# Patient Record
Sex: FEMALE | Race: WHITE | NOT HISPANIC OR LATINO | Employment: FULL TIME | URBAN - METROPOLITAN AREA
[De-identification: names, ages, dates, MRNs, and addresses within clinical notes are randomized per-mention and may not be internally consistent; named-entity substitution may affect disease eponyms.]

---

## 2017-01-17 ENCOUNTER — OFFICE VISIT (OUTPATIENT)
Dept: PSYCHIATRY | Facility: CLINIC | Age: 32
End: 2017-01-17
Payer: COMMERCIAL

## 2017-01-17 DIAGNOSIS — F41.9 ANXIETY: ICD-10-CM

## 2017-01-17 DIAGNOSIS — F33.0 MDD (MAJOR DEPRESSIVE DISORDER), RECURRENT EPISODE, MILD: Primary | ICD-10-CM

## 2017-01-17 PROCEDURE — 90834 PSYTX W PT 45 MINUTES: CPT | Mod: S$GLB,,, | Performed by: SOCIAL WORKER

## 2017-01-18 NOTE — PROGRESS NOTES
Individual Psychotherapy (PhD/LCSW)    1/17/2017    Site:  [facility]         Therapeutic Intervention: Met with patient.  Outpatient - Insight oriented psychotherapy 45 min - CPT code 12285 and Outpatient - Supportive psychotherapy 45 min - CPT Code 55330    Chief complaint/reason for encounter: depression and anxiety     Interval history and content of current session: Patient returned to the clinic today for follow up appointment.  Checks in as feeling good today.  She reports her mood has improved.  Prozac was recently increased.  Coping better with stress at work.  She continues to work on setting appropriate boundaries with work (eg not checking work e-mail when at home).  She and her spouse are trying to eat better.  They have started a new diet, and are holding each other accountable.  Discussed life balance and self-care with patient.  She is looking forward to taking a trip with her spouse during [place].  They are travelling to see his family in [place], but are first stopping in [place].      Treatment plan:  · Target symptoms: depression, anxiety   · Why chosen therapy is appropriate versus another modality: relevant to diagnosis, patient responds to this modality  · Outcome monitoring methods: self-report, observation  · Therapeutic intervention type: insight oriented psychotherapy, supportive psychotherapy    Risk parameters:  Patient reports no suicidal ideation  Patient reports no homicidal ideation  Patient reports no self-injurious behavior  Patient reports no violent behavior    Verbal deficits: None    Patient's response to intervention:  The patient's response to intervention is accepting.    Progress toward goals and other mental status changes:  The patient's progress toward goals is fair .    Diagnosis:     ICD-10-CM ICD-9-CM   1. MDD (major depressive disorder), recurrent episode, mild F33.0 296.31   2. Anxiety F41.9 300.00       Plan:  individual psychotherapy and medication  management by physician    Return to clinic: 2 weeks    Length of Service (minutes): 45

## 2017-02-08 ENCOUNTER — PATIENT MESSAGE (OUTPATIENT)
Dept: PSYCHIATRY | Facility: CLINIC | Age: 32
End: 2017-02-08

## 2017-02-08 RX ORDER — FLUOXETINE HYDROCHLORIDE 20 MG/1
20 CAPSULE ORAL DAILY
Qty: 30 CAPSULE | Refills: 2 | Status: SHIPPED | OUTPATIENT
Start: 2017-02-08 | End: 2017-05-16 | Stop reason: SDUPTHER

## 2017-02-10 ENCOUNTER — OFFICE VISIT (OUTPATIENT)
Dept: PSYCHIATRY | Facility: CLINIC | Age: 32
End: 2017-02-10
Payer: COMMERCIAL

## 2017-02-10 DIAGNOSIS — F33.0 MDD (MAJOR DEPRESSIVE DISORDER), RECURRENT EPISODE, MILD: Primary | ICD-10-CM

## 2017-02-10 DIAGNOSIS — F41.9 ANXIETY: ICD-10-CM

## 2017-02-10 PROCEDURE — 90834 PSYTX W PT 45 MINUTES: CPT | Mod: S$GLB,,, | Performed by: SOCIAL WORKER

## 2017-02-13 NOTE — PROGRESS NOTES
Individual Psychotherapy (PhD/LCSW)    2/10/2017    Site:  [facility]         Therapeutic Intervention: Met with patient.  Outpatient - Insight oriented psychotherapy 45 min - CPT code 59557 and Outpatient - Supportive psychotherapy 45 min - CPT Code 75250    Chief complaint/reason for encounter: depression and anxiety     Interval history and content of current session: Patient returned to the clinic today for follow up appointment.  She is tearful today.  Feeling overwhelmed at work.  Reports her supervisor recently put in her resignation, which will invariably increase patient's workload.  She is working up to 12 hours a day, and is working weekends.  Working this Sunday.  Reflects on how this makes it difficult to maintain adequate life balance and self-care.  Discussed stress management with patient.  Her spouse continues to be a good support.       Treatment plan:  · Target symptoms: depression, anxiety   · Why chosen therapy is appropriate versus another modality: relevant to diagnosis, evidence based practice  · Outcome monitoring methods: self-report, observation  · Therapeutic intervention type: insight oriented psychotherapy, supportive psychotherapy    Risk parameters:  Patient reports no suicidal ideation  Patient reports no homicidal ideation  Patient reports no self-injurious behavior  Patient reports no violent behavior    Verbal deficits: None    Patient's response to intervention:  The patient's response to intervention is accepting.    Progress toward goals and other mental status changes:  The patient's progress toward goals is fair .    Diagnosis:     ICD-10-CM ICD-9-CM   1. MDD (major depressive disorder), recurrent episode, mild F33.0 296.31   2. Anxiety F41.9 300.00       Plan:  individual psychotherapy and medication management by physician    Return to clinic: 2 weeks    Length of Service (minutes): 45

## 2017-03-14 ENCOUNTER — OFFICE VISIT (OUTPATIENT)
Dept: PSYCHIATRY | Facility: CLINIC | Age: 32
End: 2017-03-14
Payer: COMMERCIAL

## 2017-03-14 VITALS
HEIGHT: 67 IN | WEIGHT: 247 LBS | HEART RATE: 95 BPM | BODY MASS INDEX: 38.77 KG/M2 | SYSTOLIC BLOOD PRESSURE: 141 MMHG | DIASTOLIC BLOOD PRESSURE: 92 MMHG

## 2017-03-14 DIAGNOSIS — F33.0 MDD (MAJOR DEPRESSIVE DISORDER), RECURRENT EPISODE, MILD: Primary | ICD-10-CM

## 2017-03-14 DIAGNOSIS — F41.1 GAD (GENERALIZED ANXIETY DISORDER): ICD-10-CM

## 2017-03-14 PROCEDURE — 99213 OFFICE O/P EST LOW 20 MIN: CPT | Mod: S$GLB,,, | Performed by: PSYCHIATRY & NEUROLOGY

## 2017-03-14 PROCEDURE — 99999 PR PBB SHADOW E&M-EST. PATIENT-LVL III: CPT | Mod: PBBFAC,,, | Performed by: PSYCHIATRY & NEUROLOGY

## 2017-03-14 PROCEDURE — 1160F RVW MEDS BY RX/DR IN RCRD: CPT | Mod: S$GLB,,, | Performed by: PSYCHIATRY & NEUROLOGY

## 2017-03-14 RX ORDER — FLUOXETINE HYDROCHLORIDE 40 MG/1
40 CAPSULE ORAL DAILY
Qty: 30 CAPSULE | Refills: 2 | Status: SHIPPED | OUTPATIENT
Start: 2017-03-14 | End: 2017-05-16 | Stop reason: SDUPTHER

## 2017-03-14 RX ORDER — BUPROPION HYDROCHLORIDE 75 MG/1
75 TABLET ORAL 2 TIMES DAILY
Qty: 60 TABLET | Refills: 1 | Status: SHIPPED | OUTPATIENT
Start: 2017-03-14 | End: 2017-05-16 | Stop reason: SINTOL

## 2017-03-14 RX ORDER — ALPRAZOLAM 0.5 MG/1
0.5 TABLET ORAL DAILY PRN
Qty: 30 TABLET | Refills: 1 | Status: SHIPPED | OUTPATIENT
Start: 2017-03-14 | End: 2017-05-16 | Stop reason: SDUPTHER

## 2017-03-14 NOTE — PROGRESS NOTES
"03/14/2017  Radha Fonseca  [id number]   9:51 AM          Psychiatry Clinic Note    SUBJECTIVE  Radha Fonseca is a 31 y.o. old female who presents to clinic today for follow up of MDD.  Chronic stressors involve relationship with /his chronic drinking, recently restarted smoking marijuana.    States that her anxiety worsened severely with her current boss leaving her position.  This caused her to take on more of the load at work which caused her anxiety to increased.  States the past few weeks her irritability and snapping at co-workers increased along with more uncontrolled worry.  She continues to have problems with focus stating that its hard to tell if this is her anxiety causing her to having issues with multitasking different things.  She feels that her depression has gotten a little worse.  Sleep continues to be somewhat of an issue.  States that some nights she has issues with sleep secondary to "constant thoughts in my head".  Was on Trazodone in the past, but it was causing next day sedation.  Denies any SI/HI/AVH. Requested one week ago to increase to 60 mg of Prozac.  Feels that it has been working effectively, feels somewhat more even at this dosage. Minimally taking Xanax as needed; still on first bottle from months ago.    Rx history reviewed:  Trazodone 100mg (morning grogginess)   Effexor - +irritability, emotional unease  Wellbutrin - "didn't do anything to control/aid emotional response"    Psych ROS:  Issues/problems with:   Sleep "comes and goes"  Appetite changes no  Low energy yes, improved   Poor concentration unchanged, somewhat distracted  Suicidal ideation no  Depressed mood yes  Anhedonia no  Anxiety yes    Risk Parameters:   Patient reports no suicidal ideation   Patient reports no homicidal ideation   Patient reports no self-injurious behavior   Patient reports no violent behavior     Psychotropic Medications:   Prozac 40mg daily  Xanax 0.5mg, using 2x3 x's qWeek     Patient " reports that She is tolerating medications as prescribed without any adverse side effects. Some concern about weight    Other Home Medications:  Nuvaring  Vitamins    Medial ROS:  General ROS: negative  ENT ROS: negative  Cardiovascular ROS: no chest pain or dyspnea on exertion  Respiratory ROS: no cough, shortness of breath, or wheezing  Gastrointestinal ROS: +mild nausea, no abdominal pain, change in bowel habits, or black or bloody stools  Neurological ROS: no TIA or stroke symptoms, +HA      OBJECTIVE    Vitals:    03/14/17 0940   BP: (!) 141/92   Pulse: 95     BP Readings from Last 3 Encounters:   03/14/17 (!) 141/92   12/13/16 (!) 137/92   09/20/16 (!) 135/99         Wt Readings from Last 3 Encounters:   03/14/17 112 kg (247 lb)   12/13/16 110.2 kg (243 lb)   09/20/16 111.1 kg (245 lb)       MENTAL STATUS EXAM  Appearance: stated age  Behavior: calm, cooperative  Speech: normal rate, rhythm, volume  Thought process: linear, logical  Thought content: no SI/HI, no AH/VH  Mood: dysphoric, anxious  Affect: mood-congruent; full  Cognition: intact  Insight: intact  Judgment: intact      ASSESSMENT AND PLAN  Radha Fonseca is a 31 y.o. old female with:    Impression  MDD, recurrent mild with anxious features    Plan  1.  Continue Prozac 60 mg daily for anxiety and depression   2.  Continue Xanax 0.5mg daily as needed for anxiety, taking 1-2x's/week; refilled today  3.  Patient seeing Jacky Dawson for psychotherapy; continue therapy  4.  Will monitor sleep, may consider sleep aid in the past.   5.  Monitor BP; discussed she needs to follow up with PCP  6.  Start Wellbutrin 150 total daily for depression and attention issues; 75 mg twice daily; XR dosage increased anxiety - per patient would like to try BID dosing; may consider Strattera in the future for attention issues    #Take all medications as prescribed.  Abstain from recreational drugs and alcohol.  #Present to ED for suicidality, homicidality, psychosis, or  medical emergency.    Discussed diagnosis, risks and benefits of proposed treatment above vs alternative treatments vs no treatment, and potential side effects of these treatments. The patient expresses understanding of the above and displays the capacity to agree with this treatment given said understanding. Patient also agrees that, currently, the benefits outweigh the risks and would like to pursue treatment at this time.     Return to clinic 2 months    Will discuss case with Dr. Bermudez, staff psychiatrist.    Cat Ratliff MD  Psychiatry - PGY3  03/14/2017

## 2017-03-16 ENCOUNTER — OFFICE VISIT (OUTPATIENT)
Dept: PSYCHIATRY | Facility: CLINIC | Age: 32
End: 2017-03-16
Payer: COMMERCIAL

## 2017-03-16 DIAGNOSIS — F33.0 MDD (MAJOR DEPRESSIVE DISORDER), RECURRENT EPISODE, MILD: Primary | ICD-10-CM

## 2017-03-16 DIAGNOSIS — F41.9 ANXIETY: ICD-10-CM

## 2017-03-16 PROCEDURE — 90834 PSYTX W PT 45 MINUTES: CPT | Mod: S$GLB,,, | Performed by: SOCIAL WORKER

## 2017-04-06 ENCOUNTER — OFFICE VISIT (OUTPATIENT)
Dept: PSYCHIATRY | Facility: CLINIC | Age: 32
End: 2017-04-06
Payer: COMMERCIAL

## 2017-04-06 DIAGNOSIS — F41.9 ANXIETY: ICD-10-CM

## 2017-04-06 DIAGNOSIS — F33.0 MDD (MAJOR DEPRESSIVE DISORDER), RECURRENT EPISODE, MILD: Primary | ICD-10-CM

## 2017-04-06 PROCEDURE — 90834 PSYTX W PT 45 MINUTES: CPT | Mod: S$GLB,,, | Performed by: SOCIAL WORKER

## 2017-04-07 NOTE — PROGRESS NOTES
"Individual Psychotherapy (PhD/LCSW)    4/6/2017    Site:  [facility]         Therapeutic Intervention: Met with patient.  Outpatient - Insight oriented psychotherapy 45 min - CPT code 02676 and Outpatient - Supportive psychotherapy 45 min - CPT Code 89284    Chief complaint/reason for encounter: depression and anxiety     Interval history and content of current session: Patient returned to the clinic today for follow up appointment.  She is tearful intermittently today.  Reviewed her responses to the miracle question.  Reflects on feelings of frustration and anger she needs to work through, "for things to be better".  Continues to feel stress at work.  Reflects on how she never feels like she is "good enough".  Discussed positive self-affirmations with patient, as well as positive thinking.  She wants to find a new apartment, as she and her spouse have been in dispute with the  of their current place.  Reports this has been a source of anxiety for her.  Having difficulty following through with daily commitments.  Discussed with patient the importance of self-care and life balance.              Treatment plan:  · Target symptoms: depression, anxiety   · Why chosen therapy is appropriate versus another modality: relevant to diagnosis, patient responds to this modality  · Outcome monitoring methods: self-report, observation  · Therapeutic intervention type: insight oriented psychotherapy, supportive psychotherapy    Risk parameters:  Patient reports no suicidal ideation  Patient reports no homicidal ideation  Patient reports no self-injurious behavior  Patient reports no violent behavior    Verbal deficits: None    Patient's response to intervention:  The patient's response to intervention is accepting.    Progress toward goals and other mental status changes:  The patient's progress toward goals is fair .    Diagnosis:     ICD-10-CM ICD-9-CM   1. MDD (major depressive disorder), recurrent " episode, mild F33.0 296.31   2. Anxiety F41.9 300.00       Plan:  individual psychotherapy and medication management by physician    Return to clinic: 3 weeks    Length of Service (minutes): 45

## 2017-04-24 ENCOUNTER — PATIENT MESSAGE (OUTPATIENT)
Dept: PSYCHIATRY | Facility: CLINIC | Age: 32
End: 2017-04-24

## 2017-05-16 ENCOUNTER — OFFICE VISIT (OUTPATIENT)
Dept: PSYCHIATRY | Facility: CLINIC | Age: 32
End: 2017-05-16
Payer: COMMERCIAL

## 2017-05-16 VITALS
DIASTOLIC BLOOD PRESSURE: 83 MMHG | HEART RATE: 94 BPM | HEIGHT: 67 IN | WEIGHT: 241 LBS | BODY MASS INDEX: 37.83 KG/M2 | SYSTOLIC BLOOD PRESSURE: 138 MMHG

## 2017-05-16 DIAGNOSIS — F41.1 GAD (GENERALIZED ANXIETY DISORDER): ICD-10-CM

## 2017-05-16 DIAGNOSIS — F33.1 MAJOR DEPRESSIVE DISORDER, RECURRENT EPISODE, MODERATE: Primary | ICD-10-CM

## 2017-05-16 PROCEDURE — 99213 OFFICE O/P EST LOW 20 MIN: CPT | Mod: S$GLB,,, | Performed by: PSYCHIATRY & NEUROLOGY

## 2017-05-16 PROCEDURE — 99999 PR PBB SHADOW E&M-EST. PATIENT-LVL III: CPT | Mod: PBBFAC,,, | Performed by: PSYCHIATRY & NEUROLOGY

## 2017-05-16 RX ORDER — FLUOXETINE HYDROCHLORIDE 40 MG/1
40 CAPSULE ORAL DAILY
Qty: 30 CAPSULE | Refills: 2 | Status: SHIPPED | OUTPATIENT
Start: 2017-05-16 | End: 2017-08-08 | Stop reason: SDUPTHER

## 2017-05-16 RX ORDER — FLUOXETINE HYDROCHLORIDE 20 MG/1
20 CAPSULE ORAL DAILY
Qty: 30 CAPSULE | Refills: 2 | Status: SHIPPED | OUTPATIENT
Start: 2017-05-16 | End: 2017-08-08 | Stop reason: SDUPTHER

## 2017-05-16 RX ORDER — QUETIAPINE FUMARATE 200 MG/1
200 TABLET, FILM COATED ORAL DAILY
Qty: 30 TABLET | Refills: 0 | Status: SHIPPED | OUTPATIENT
Start: 2017-05-16 | End: 2017-06-13 | Stop reason: SDUPTHER

## 2017-05-16 RX ORDER — ALPRAZOLAM 0.5 MG/1
0.5 TABLET ORAL DAILY PRN
Qty: 30 TABLET | Refills: 1 | Status: SHIPPED | OUTPATIENT
Start: 2017-05-16 | End: 2017-08-08 | Stop reason: SDUPTHER

## 2017-05-16 NOTE — PROGRESS NOTES
"05/16/2017  Radha Fonseca  [id number]   9:27 AM          Psychiatry Clinic Note    SUBJECTIVE  Radha Fonseca is a 31 y.o. old female who presents to clinic today for follow up of MDD.  Chronic stressors involve relationship with /his chronic drinking, recently restarted smoking marijuana.    States that her anxiety worsened severely.  States more days then not, she is feeling overwhelmed and anxious.  This is causing mood swings and irritability.  She has started scratching herself to deal with stress.  Work has been stressful.  Depression has worsened.  She admits to some days of tearfulness.  Drinking has increased slightly as a way to deal with anxiety especially at night.  She feels that the mood swings happen throughout the day moments where she feels energized and then moments where she feels very down.  At night she is having racing thoughts, constantly has different things racing through her head.  Denies any SI/HI/AVH. Xanax use has increased slightly, not using it daily but definitely using it 3-4 times a week now.  Still occasionally using marijuana.  Was not able to tolerate the Wellbutrin which was started for depression adjunct and help with concentration.  It caused her to have worsening anxiety and she was unable to tolerate it.  PCP says she has signs of fatty liver disease.  She is going to GI doctor soon for work-up.    Rx history reviewed:  Trazodone 100mg (morning grogginess)   Effexor - +irritability, emotional unease  Wellbutrin - "didn't do anything to control/aid emotional response"; increased anxiety    Psych ROS:  Issues/problems with:   Sleep poor  Appetite changes low  Low energy low   Poor concentration unchanged, somewhat distracted  Suicidal ideation no  Depressed mood yes  Anhedonia no  Anxiety yes    Risk Parameters:   Patient reports no suicidal ideation   Patient reports no homicidal ideation   Patient reports no self-injurious behavior   Patient reports no violent behavior "     Psychotropic Medications:   Prozac 60mg daily  Xanax 0.5mg, using 2x3 x's qWeek  Wellbutrin 150 mg XL daily    Patient reports that She is tolerating medications as prescribed without any adverse side effects. Some concern about weight    Other Home Medications:  Nuvaring  Vitamins    Medial ROS:  General ROS: negative  ENT ROS: negative  Cardiovascular ROS: no chest pain or dyspnea on exertion  Respiratory ROS: no cough, shortness of breath, or wheezing  Gastrointestinal ROS: +mild nausea, no abdominal pain, change in bowel habits, or black or bloody stools  Neurological ROS: no TIA or stroke symptoms, +HA      OBJECTIVE    Vitals:    05/16/17 0902   BP: 138/83   Pulse: 94     BP Readings from Last 3 Encounters:   05/16/17 138/83   03/14/17 (!) 141/92   12/13/16 (!) 137/92         Wt Readings from Last 3 Encounters:   05/16/17 109.3 kg (241 lb)   03/14/17 112 kg (247 lb)   12/13/16 110.2 kg (243 lb)       MENTAL STATUS EXAM  Appearance: stated age  Behavior: calm, cooperative  Speech: normal rate, rhythm, volume  Thought process: linear, logical  Thought content: no SI/HI, no AH/VH  Mood: dysphoric, anxious  Affect: mood-congruent; full  Cognition: intact  Insight: intact  Judgment: intact      ASSESSMENT AND PLAN  Radha Fonseca is a 31 y.o. old female with:    Impression  MDD, recurrent mild with anxious features    Plan  1.  Continue Prozac 60 mg daily for anxiety and depression   2.  Continue Xanax 0.5mg daily as needed for anxiety, taking 1-2x's/week; refilled today  3.  Patient seeing Jacky Dawson for psychotherapy; continue therapy  4.  Monitor BP; discussed she needs to follow up with PCP  4.  Start Seroquel 100 mg QHS for anxiety and depression adjunct therapy; mood stabilization qualities may help with the irritability and mood swings she is having right now from her depression and anxiety    #Take all medications as prescribed.  Abstain from recreational drugs and alcohol.  #Present to ED for  suicidality, homicidality, psychosis, or medical emergency.    Discussed diagnosis, risks and benefits of proposed treatment above vs alternative treatments vs no treatment, and potential side effects of these treatments. The patient expresses understanding of the above and displays the capacity to agree with this treatment given said understanding. Patient also agrees that, currently, the benefits outweigh the risks and would like to pursue treatment at this time.     Return to clinic 1 month    Will discuss case with Dr. Bermudez, staff psychiatrist.    Cat Ratliff MD  Psychiatry - PGY3  05/16/2017

## 2017-05-31 NOTE — PROGRESS NOTES
Staff Note:     Case discussed.  I agree with Resident's findings and treatment plan.  Watch for any s/s of cyclic mood d/o.

## 2017-06-13 ENCOUNTER — OFFICE VISIT (OUTPATIENT)
Dept: PSYCHIATRY | Facility: CLINIC | Age: 32
End: 2017-06-13
Payer: COMMERCIAL

## 2017-06-13 VITALS
WEIGHT: 244 LBS | DIASTOLIC BLOOD PRESSURE: 92 MMHG | SYSTOLIC BLOOD PRESSURE: 140 MMHG | HEART RATE: 81 BPM | BODY MASS INDEX: 38.3 KG/M2 | HEIGHT: 67 IN

## 2017-06-13 DIAGNOSIS — F33.41 MAJOR DEPRESSIVE DISORDER, RECURRENT EPISODE, IN PARTIAL REMISSION WITH ANXIOUS DISTRESS: Primary | ICD-10-CM

## 2017-06-13 DIAGNOSIS — F41.1 GAD (GENERALIZED ANXIETY DISORDER): ICD-10-CM

## 2017-06-13 PROCEDURE — 99999 PR PBB SHADOW E&M-EST. PATIENT-LVL III: CPT | Mod: PBBFAC,,, | Performed by: PSYCHIATRY & NEUROLOGY

## 2017-06-13 PROCEDURE — 99213 OFFICE O/P EST LOW 20 MIN: CPT | Mod: S$GLB,,, | Performed by: PSYCHIATRY & NEUROLOGY

## 2017-06-13 RX ORDER — QUETIAPINE FUMARATE 200 MG/1
200 TABLET, FILM COATED ORAL DAILY
Qty: 30 TABLET | Refills: 1 | Status: SHIPPED | OUTPATIENT
Start: 2017-06-13 | End: 2017-08-08 | Stop reason: SDUPTHER

## 2017-06-13 NOTE — PROGRESS NOTES
"06/13/2017  Radha Fonseca  [id number]   10:03 AM          Psychiatry Clinic Note    SUBJECTIVE  Radha Fonseca is a 31 y.o. old female who presents to clinic today for follow up of MDD.  Chronic stressors involve relationship with /his chronic drinking, recently restarted smoking marijuana.    At last appointment, 1 month ago was started on Seroquel 100 mg at night for depression adjunct and anxiety.  Has felt improvement in her symptoms with the new medication.  Feels less anxious and per report from  to her - she has been doing better with her mood.   states that she is doing more things and slightly happier.  Stress has not been overwhelming her as strongly or as often as before.  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  Has been taking the Xanax very infrequently the last month which to her shows that her anxiety is better.  Depression seems stable.  Does admit to some oversedation from the Seroquel that sometimes causes her to have trouble waking up in the morning.  She has started taking the medication a little earlier in the night more like late afternoon as the medication doesn't initially make her sleepy.  Denies any SI/HI/AVH.  PCP says she has signs of fatty liver disease.  She is going to GI doctor soon for work-up.    Rx history reviewed:  Trazodone 100mg (morning grogginess)   Effexor - +irritability, emotional unease  Wellbutrin - "didn't do anything to control/aid emotional response"; increased anxiety    Psych ROS:  Issues/problems with:   Sleep improved  Appetite changes low  Low energy stable   Poor concentration stable  Suicidal ideation no  Depressed mood yes, slightly better  Anhedonia no  Anxiety yes    Risk Parameters:   Patient reports no suicidal ideation   Patient reports no homicidal ideation   Patient reports no self-injurious behavior   Patient reports no violent behavior "     Psychotropic Medications:   Prozac 60mg daily  Xanax 0.5mg, using 4-5 times a month  Seroquel 100 mg at night    Patient reports that some morning sedation with the Seroquel    Other Home Medications:  Nuvaring  Vitamins    Medial ROS:  General ROS: negative  ENT ROS: negative  Cardiovascular ROS: no chest pain or dyspnea on exertion  Respiratory ROS: no cough, shortness of breath, or wheezing  Gastrointestinal ROS: +mild nausea, no abdominal pain, change in bowel habits, or black or bloody stools  Neurological ROS: no TIA or stroke symptoms, +HA      OBJECTIVE    Vitals:    06/13/17 1002   BP: (!) 140/92   Pulse: 81     BP Readings from Last 3 Encounters:   06/13/17 (!) 140/92   05/16/17 138/83   03/14/17 (!) 141/92         Wt Readings from Last 3 Encounters:   06/13/17 110.7 kg (244 lb)   05/16/17 109.3 kg (241 lb)   03/14/17 112 kg (247 lb)       MENTAL STATUS EXAM  Appearance: stated age  Behavior: calm, cooperative  Speech: normal rate, rhythm, volume  Thought process: linear, logical  Thought content: no SI/HI, no AH/VH  Mood: euthymic  Affect: mood-congruent; full  Cognition: intact  Insight: intact  Judgment: intact      ASSESSMENT AND PLAN  Radha Fonseca is a 31 y.o. old female with:    Impression  MDD, recurrent mild with anxious features    Plan  1.  Continue Prozac 60 mg daily for anxiety and depression   2.  Continue Xanax 0.5mg daily as needed for anxiety, taking 1-2x's/week; refilled today  3.  Patient seeing Jacky Dawson for psychotherapy; continue therapy  4.  Monitor BP; discussed she needs to follow up with PCP  5.  Start Seroquel 100 mg QHS for anxiety and depression adjunct therapy; prescribed 200 mg tablet - take one half tablet at night; mood stabilization qualities may help with the irritability and mood swings she is having right now from her depression and anxiety  6.  Discussed resident transition in July    #Take all medications as prescribed.  Abstain from recreational drugs and  alcohol.  #Present to ED for suicidality, homicidality, psychosis, or medical emergency.    Discussed diagnosis, risks and benefits of proposed treatment above vs alternative treatments vs no treatment, and potential side effects of these treatments. The patient expresses understanding of the above and displays the capacity to agree with this treatment given said understanding. Patient also agrees that, currently, the benefits outweigh the risks and would like to pursue treatment at this time.     Return to clinic 2 month    Will discuss case with Dr. Bermudez, staff psychiatrist.    Cat Ratliff MD  Psychiatry - PGY3  06/13/2017

## 2017-08-08 ENCOUNTER — OFFICE VISIT (OUTPATIENT)
Dept: PSYCHIATRY | Facility: CLINIC | Age: 32
End: 2017-08-08
Payer: COMMERCIAL

## 2017-08-08 VITALS
DIASTOLIC BLOOD PRESSURE: 85 MMHG | WEIGHT: 249 LBS | HEIGHT: 67 IN | BODY MASS INDEX: 39.08 KG/M2 | HEART RATE: 94 BPM | SYSTOLIC BLOOD PRESSURE: 133 MMHG

## 2017-08-08 DIAGNOSIS — F33.41 RECURRENT MAJOR DEPRESSIVE DISORDER, IN PARTIAL REMISSION: Primary | ICD-10-CM

## 2017-08-08 DIAGNOSIS — F41.1 GAD (GENERALIZED ANXIETY DISORDER): ICD-10-CM

## 2017-08-08 PROCEDURE — 99213 OFFICE O/P EST LOW 20 MIN: CPT | Mod: S$GLB,,, | Performed by: PSYCHIATRY & NEUROLOGY

## 2017-08-08 PROCEDURE — 99999 PR PBB SHADOW E&M-EST. PATIENT-LVL II: CPT | Mod: PBBFAC,,, | Performed by: PSYCHIATRY & NEUROLOGY

## 2017-08-08 PROCEDURE — 3008F BODY MASS INDEX DOCD: CPT | Mod: S$GLB,,, | Performed by: PSYCHIATRY & NEUROLOGY

## 2017-08-08 RX ORDER — FLUOXETINE HYDROCHLORIDE 20 MG/1
20 CAPSULE ORAL DAILY
Qty: 30 CAPSULE | Refills: 2 | Status: SHIPPED | OUTPATIENT
Start: 2017-08-08 | End: 2017-10-17 | Stop reason: SDUPTHER

## 2017-08-08 RX ORDER — ALPRAZOLAM 0.5 MG/1
0.5 TABLET ORAL DAILY PRN
Qty: 30 TABLET | Refills: 1 | Status: SHIPPED | OUTPATIENT
Start: 2017-08-08 | End: 2017-10-17 | Stop reason: SDUPTHER

## 2017-08-08 RX ORDER — QUETIAPINE FUMARATE 50 MG/1
50 TABLET, FILM COATED ORAL NIGHTLY
Qty: 30 TABLET | Refills: 1 | Status: SHIPPED | OUTPATIENT
Start: 2017-08-08 | End: 2017-10-17 | Stop reason: SDUPTHER

## 2017-08-08 RX ORDER — FLUOXETINE HYDROCHLORIDE 40 MG/1
40 CAPSULE ORAL DAILY
Qty: 30 CAPSULE | Refills: 2 | Status: SHIPPED | OUTPATIENT
Start: 2017-08-08 | End: 2017-10-17 | Stop reason: SDUPTHER

## 2017-08-08 NOTE — PROGRESS NOTES
08/08/2017  10:38 AM  Radha Fonseca  [id number]          Psychiatry Clinic Note    SUBJECTIVE  Radha Fonseca is a 32 y.o. old female who presents to clinic today for follow up of MDD, FLAKITA.  She reports that since the last visit she has found that her mood is much more stable with seroquel, but she has been unable to tolerate the dose of the medication. She reports that she has been trying to take only 1/3 of the 200mg tablets but she still feels tired. She reports that as a result of this, she has only taken the medicaiton a couple of times each week. She is interested in a lower dose, as she like the stability that it provides. 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 Things have been going well at home overall. She reports that her appetite is unchanged. Her energy level is low in the morning, which she attributes to residual effects from seroquel. She reports that prozac has done well for her in helping decrease the frequency and intensity of her anxiety and depressive symptoms. She has been taking approximately 1-2 xanax per week and reports that she had been substituting seroquel for xanax at night because the dose of seroquel was too sedating.    PSYCHIATRIC ROS  Denies: delusions, paranoia,  periods of persistently elevated/expansive or irritable mood and/or increased goal directed behavior.    Issues/problems with:   Sleep yes, hypersomnolent with 100mg seroquel  Appetite changes no  Low energy yes, in the morning following seroquel  Poor concentration no  Psychomotor agitation no  Suicidal ideation no  Depressed mood no  Anhedonia no  Anxiety yes, but improved  Worry no  Fear no    CURRENT HOME PSYCHIATRIC MEDICATIONS: Prozac 60mg Daily, Seroquel 100mg QHS, Xanax 0.5mg Daily PRN    Patient reports that She is tolerating medications as prescribed without any adverse side effects aside from oversedation from seroquel.  "      MEDICAL ROS  General ROS: negative for - chills or fever  ENT ROS: negative for - headaches or sore throat  Cardiovascular ROS: negative for - chest pain or shortness of breath  Respiratory ROS: negative for - cough or wheezing  Gastrointestinal ROS: negative for - constipation, diarrhea or nausea/vomiting  Neurological ROS: negative for - behavioral changes or confusion  Dermatological ROS: negative for acne and rash  Endocrine ROS: negative for - temperature intolerance or unexpected weight changes      OBJECTIVE    Vitals:    08/08/17 1013   BP: 133/85   Pulse: 94       MENTAL STATUS EXAM  Appearance: no apparent distress, casually dressed  Behavior/Cooperation/Attitude: calm, cooperative, engaged  Speech: conversational rate/tone/volume  Mood: "good"  Affect: reactive/appropriate  Thought Process: linear  Thought Content: denies SI/HI/AVH  Sensorium: awake/alert  Orientation: oriented to person/place/day of week/situation  Attention/Memory: recent and remote intact  Calculation/Concentration: intact to conversation  Fund of Knowledge: intact to conversation  Abstraction: intact to conversation  Insight: fair  Judgment: appropriate for setting  Impulse Control: fair  Reliability: fair      STATUS/PROGRESS Based on the examination today, the patient's problem(s) is/are stable. New problems have not presented today. Co-morbidities are not complicating management of the primary condition. There are not active rule-out diagnoses for this patient at this time.       ASSESSMENT AND PLAN  31 y/o  female with FLAKITA with associated panic symptoms, MDD in partial remission  - Continue Prozac 60mg Daily  - Continue Xanax 0.5mg Daily PRN panic symptoms, patient has decreased dose overall and only takes 1-2 times per week.  - Decreasing Seroquel from 100mg to 50mg 2/2 patient's somnolence and over sedation on higher dose.      Discussed diagnosis, risks and benefits of proposed treatment above vs alternative " treatments vs no treatment, and potential side effects of these treatments. The patient expresses understanding of the above and displays the capacity to agree with this treatment given said understanding. Patient also agrees that, currently, the benefits outweigh the risks and would like to pursue treatment at this time.     Return to clinic 2-3  months    Bam Adams MD  [facility]  PGY III

## 2017-10-17 ENCOUNTER — OFFICE VISIT (OUTPATIENT)
Dept: PSYCHIATRY | Facility: CLINIC | Age: 32
End: 2017-10-17
Payer: COMMERCIAL

## 2017-10-17 VITALS
DIASTOLIC BLOOD PRESSURE: 100 MMHG | HEIGHT: 67 IN | BODY MASS INDEX: 38.74 KG/M2 | SYSTOLIC BLOOD PRESSURE: 138 MMHG | WEIGHT: 246.81 LBS | HEART RATE: 97 BPM

## 2017-10-17 DIAGNOSIS — F33.41 RECURRENT MAJOR DEPRESSIVE DISORDER, IN PARTIAL REMISSION: ICD-10-CM

## 2017-10-17 PROCEDURE — 99999 PR PBB SHADOW E&M-EST. PATIENT-LVL III: CPT | Mod: PBBFAC,,, | Performed by: PSYCHIATRY & NEUROLOGY

## 2017-10-17 PROCEDURE — 99213 OFFICE O/P EST LOW 20 MIN: CPT | Mod: S$GLB,,, | Performed by: PSYCHIATRY & NEUROLOGY

## 2017-10-17 RX ORDER — QUETIAPINE FUMARATE 50 MG/1
50 TABLET, FILM COATED ORAL NIGHTLY
Qty: 30 TABLET | Refills: 2 | Status: SHIPPED | OUTPATIENT
Start: 2017-10-17 | End: 2018-01-05 | Stop reason: SDUPTHER

## 2017-10-17 RX ORDER — FLUOXETINE HYDROCHLORIDE 20 MG/1
20 CAPSULE ORAL DAILY
Qty: 30 CAPSULE | Refills: 2 | Status: SHIPPED | OUTPATIENT
Start: 2017-10-17 | End: 2018-01-05 | Stop reason: SDUPTHER

## 2017-10-17 RX ORDER — BUPROPION HYDROCHLORIDE 75 MG/1
37.5 TABLET ORAL 2 TIMES DAILY
Qty: 30 TABLET | Refills: 2 | Status: SHIPPED | OUTPATIENT
Start: 2017-10-17 | End: 2018-01-05 | Stop reason: SDUPTHER

## 2017-10-17 RX ORDER — FLUOXETINE HYDROCHLORIDE 40 MG/1
40 CAPSULE ORAL DAILY
Qty: 30 CAPSULE | Refills: 2 | Status: SHIPPED | OUTPATIENT
Start: 2017-10-17 | End: 2018-01-05 | Stop reason: SDUPTHER

## 2017-10-17 RX ORDER — ALPRAZOLAM 0.5 MG/1
0.5 TABLET ORAL DAILY PRN
Qty: 30 TABLET | Refills: 1 | Status: SHIPPED | OUTPATIENT
Start: 2017-10-17 | End: 2018-01-31 | Stop reason: SDUPTHER

## 2017-10-17 NOTE — PROGRESS NOTES
"10/17/2017  9:28 AM  Radha Fonseca  [id number]          Psychiatry Clinic Note    SUBJECTIVE  Radha Fonseca is a 32 y.o. old female who presents to clinic today for follow up of Depression, anxiety and insomnia.  She reports that since the last visit she had several weeks where she was not doing well. She reports that they have lost another coworker and she is now doing about 5 different jobs at the [employer]. She explains that she feels like she has no energy, but is unsure how much of it is from her depression and how much is "just stress from work". Discussed symptoms of burnout, and patient agrees that her work is contributing to her lack of energy. She and her  still hope to move away, but she is unsure as to whether she should stay at her current job until then or to look for other jobs until they move. She reports that the seroquel has stopped making her so drowsy in the morning and she feels that she is adjusted to the medication. Denies SI. Reports that her mood has been slightly better with change in weather. Denies changes in appetite. Interested and open to medication changes to address her lack of energy.    PSYCHIATRIC ROS  Denies: delusions, paranoia,  periods of persistently elevated/expansive or irritable mood and/or increased goal directed behavior.    Issues/problems with:   Sleep no  Appetite changes no  Low energy yes  Poor concentration yes  Psychomotor agitation no  Suicidal ideation no  Depressed mood yes  Anhedonia yes  Anxiety no  Worry no  Fear no    CURRENT HOME PSYCHIATRIC MEDICATIONS: Prozac 60mg daily, Seroquel 50mg QHS, Xanax 0.5mg daily PRN, uses about 2 per week    Patient reports that She is tolerating medications as prescribed without any adverse side effects.       MEDICAL ROS  General ROS: negative for - chills or fever  ENT ROS: negative for - headaches or visual changes  Cardiovascular ROS: negative for - chest pain or palpitations  Respiratory ROS: negative for - cough or " "shortness of breath  Gastrointestinal ROS: negative for - abdominal pain, constipation or diarrhea  Neurological ROS: negative for - confusion or dizziness  Dermatological ROS: negative for acne and rash  Endocrine ROS: negative for - temperature intolerance or unexpected weight changes      OBJECTIVE    Vitals:    10/17/17 0902   BP: (!) 138/100   Pulse: 97       MENTAL STATUS EXAM  Appearance: no apparent distress, casually dressed  Behavior/Cooperation/Attitude: calm, cooperative, engaged  Speech: conversational rate/tone/volume  Mood: "a little better today"  Affect: reactive/appropriate  Thought Process: linear  Thought Content: denies SI/HI/AVH  Sensorium: awake/alert  Orientation: oriented to person/place/day of week/situation  Attention/Memory: recent and remote intact  Calculation/Concentration: intact to conversation  Fund of Knowledge: intact to conversation  Abstraction: intact to conversation  Insight: fair  Judgment: appropriate for setting  Impulse Control: fair  Reliability: fair    STATUS/PROGRESS Based on the examination today, the patient's problem(s) is/are stable. New problems have not presented today. Co-morbidities are not complicating management of the primary condition. There are not active rule-out diagnoses for this patient at this time.       ASSESSMENT AND PLAN  1) MDD, recurrent, moderate  2) FLAKITA  3) Insomnia  4) h/o IBS    - Continue Prozac 60mg daily  - Begin augmentation trial with wellbutrin 37.5mg BID   - patient has been intolerant to higher doses of wellbutrin. Attempting to target anergic symptoms and amotivation  - Continue Seroquel 50mg QHS for insomnia   - Discussed possible trial of doxepin to address the patient's GERD symptoms and insomnia if patient is amenable at next visit.  - Continue Xanax 0.5mg Daily PRN anxiety, patient takes approximately 2 per week.        Discussed diagnosis, risks and benefits of proposed treatment above vs alternative treatments vs no " treatment, and potential side effects of these treatments. The patient expresses understanding of the above and displays the capacity to agree with this treatment given said understanding. Patient also agrees that, currently, the benefits outweigh the risks and would like to pursue treatment at this time.     Return to clinic 2 months    Bam Adams MD  [facility]  PGY III

## 2018-01-04 ENCOUNTER — PATIENT MESSAGE (OUTPATIENT)
Dept: PSYCHIATRY | Facility: CLINIC | Age: 33
End: 2018-01-04

## 2018-01-05 DIAGNOSIS — F33.41 RECURRENT MAJOR DEPRESSIVE DISORDER, IN PARTIAL REMISSION: ICD-10-CM

## 2018-01-05 RX ORDER — BUPROPION HYDROCHLORIDE 75 MG/1
37.5 TABLET ORAL 2 TIMES DAILY
Qty: 30 TABLET | Refills: 1 | Status: SHIPPED | OUTPATIENT
Start: 2018-01-05 | End: 2018-03-20 | Stop reason: SDUPTHER

## 2018-01-05 RX ORDER — FLUOXETINE HYDROCHLORIDE 40 MG/1
40 CAPSULE ORAL DAILY
Qty: 30 CAPSULE | Refills: 1 | Status: SHIPPED | OUTPATIENT
Start: 2018-01-05 | End: 2018-01-31 | Stop reason: SDUPTHER

## 2018-01-05 RX ORDER — FLUOXETINE HYDROCHLORIDE 20 MG/1
20 CAPSULE ORAL DAILY
Qty: 30 CAPSULE | Refills: 1 | Status: SHIPPED | OUTPATIENT
Start: 2018-01-05 | End: 2018-01-31 | Stop reason: SDUPTHER

## 2018-01-05 RX ORDER — QUETIAPINE FUMARATE 50 MG/1
50 TABLET, FILM COATED ORAL NIGHTLY
Qty: 30 TABLET | Refills: 1 | Status: SHIPPED | OUTPATIENT
Start: 2018-01-05 | End: 2018-01-31 | Stop reason: ALTCHOICE

## 2018-01-31 ENCOUNTER — OFFICE VISIT (OUTPATIENT)
Dept: PSYCHIATRY | Facility: CLINIC | Age: 33
End: 2018-01-31
Payer: COMMERCIAL

## 2018-01-31 VITALS
WEIGHT: 249.38 LBS | SYSTOLIC BLOOD PRESSURE: 155 MMHG | BODY MASS INDEX: 39.14 KG/M2 | HEART RATE: 97 BPM | HEIGHT: 67 IN | DIASTOLIC BLOOD PRESSURE: 99 MMHG

## 2018-01-31 DIAGNOSIS — F33.41 RECURRENT MAJOR DEPRESSIVE DISORDER, IN PARTIAL REMISSION: ICD-10-CM

## 2018-01-31 DIAGNOSIS — F41.1 GAD (GENERALIZED ANXIETY DISORDER): Primary | ICD-10-CM

## 2018-01-31 PROCEDURE — 3008F BODY MASS INDEX DOCD: CPT | Mod: S$GLB,,, | Performed by: PSYCHIATRY & NEUROLOGY

## 2018-01-31 PROCEDURE — 99999 PR PBB SHADOW E&M-EST. PATIENT-LVL III: CPT | Mod: PBBFAC,,, | Performed by: PSYCHIATRY & NEUROLOGY

## 2018-01-31 PROCEDURE — 99213 OFFICE O/P EST LOW 20 MIN: CPT | Mod: S$GLB,,, | Performed by: PSYCHIATRY & NEUROLOGY

## 2018-01-31 RX ORDER — DOXEPIN HYDROCHLORIDE 10 MG/1
10 CAPSULE ORAL NIGHTLY
Qty: 30 CAPSULE | Refills: 2 | Status: SHIPPED | OUTPATIENT
Start: 2018-01-31 | End: 2018-03-20 | Stop reason: SINTOL

## 2018-01-31 RX ORDER — FLUOXETINE HYDROCHLORIDE 20 MG/1
20 CAPSULE ORAL DAILY
Qty: 30 CAPSULE | Refills: 2 | Status: SHIPPED | OUTPATIENT
Start: 2018-01-31 | End: 2018-03-20 | Stop reason: SDUPTHER

## 2018-01-31 RX ORDER — FLUOXETINE HYDROCHLORIDE 40 MG/1
40 CAPSULE ORAL DAILY
Qty: 30 CAPSULE | Refills: 2 | Status: SHIPPED | OUTPATIENT
Start: 2018-01-31 | End: 2018-03-20

## 2018-01-31 RX ORDER — ALPRAZOLAM 1 MG/1
.5-1 TABLET ORAL DAILY PRN
Qty: 30 TABLET | Refills: 2 | Status: SHIPPED | OUTPATIENT
Start: 2018-01-31 | End: 2018-12-24

## 2018-01-31 NOTE — PROGRESS NOTES
01/31/2018  9:28 AM  Radha Fonseca  [id number]          Psychiatry Clinic Note    SUBJECTIVE  Radha Fonseca is a 32 y.o. old female who presents to clinic today for follow up of Depression, anxiety and insomnia.  She reports that since the last visit she has noticed that the addition of wellbutrin has helped her energy level during the day significantly. 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 She reports that she is open to changing from seroquel as a sleep aid, and given her issues with GERD, she is amenable to a trial of Doxepin for insomnia. She reports that her mood has been good overall, and she continues  To find benefit with Prozac for depressive episodes. She notes that she has only been using xanax approximately 2 times per week, with none in the last 5 days, but on the days when she does use it, she finds that she has to take 2 0.5mg tablets for relief, which further exacerbates her esophageal symptoms. She denies any side effects of her medications at this time. She has elevated BP today, which is higher than on previous visits. She agrees that if it remains elevated, that she is to see her PCP for control of HTN.    PSYCHIATRIC ROS  Denies: delusions, paranoia,  periods of persistently elevated/expansive or irritable mood and/or increased goal directed behavior.    Issues/problems with:   Sleep no  Appetite changes no  Low energy no  Poor concentration no  Psychomotor agitation no  Suicidal ideation no  Depressed mood no  Anhedonia no  Anxiety yes  Worry no  Fear no    CURRENT HOME PSYCHIATRIC MEDICATIONS: Prozac 60mg daily, Seroquel 50mg QHS, Xanax 0.5mg daily PRN, uses 1mg about 2 per week, Wellbutrin 37.5mg BID    Patient reports that She is tolerating medications as prescribed without any adverse side effects.       MEDICAL  "ROS  General ROS: negative for - chills or fever  ENT ROS: negative for - headaches or visual changes  Cardiovascular ROS: negative for - chest pain or palpitations  Respiratory ROS: negative for - cough or shortness of breath  Gastrointestinal ROS: negative for - abdominal pain, constipation or diarrhea, Positive for GERD symptoms  Neurological ROS: negative for - confusion or dizziness  Dermatological ROS: negative for acne and rash  Endocrine ROS: negative for - temperature intolerance or unexpected weight changes      OBJECTIVE    Vitals:    01/31/18 0926   BP: (!) 155/99   Pulse: 97   BP elevated today, higher than on previous visits. Patient agrees to recheck at her pharmacy and will follow-up with PCP if it remains elevated.    MENTAL STATUS EXAM  Appearance: no apparent distress, casually dressed  Behavior/Cooperation/Attitude: calm, cooperative, engaged  Speech: conversational rate/tone/volume  Mood: "ok"  Affect: reactive/appropriate  Thought Process: linear  Thought Content: denies SI/HI/AVH  Sensorium: awake/alert  Orientation: oriented to person/place/day of week/situation  Attention/Memory: recent and remote intact  Calculation/Concentration: intact to conversation  Fund of Knowledge: intact to conversation  Abstraction: intact to conversation  Insight: fair  Judgment: appropriate for setting  Impulse Control: fair  Reliability: fair    STATUS/PROGRESS Based on the examination today, the patient's problem(s) is/are stable. New problems have not presented today. Co-morbidities are not complicating management of the primary condition. There are not active rule-out diagnoses for this patient at this time.       ASSESSMENT AND PLAN  1) MDD, recurrent, moderate  2) FLAKITA  3) Insomnia  4) h/o IBS    - Continue Prozac 60mg daily  - Continue augmentation with wellbutrin 37.5mg BID  - Changing insomnia treatment from Seroquel 50mg QHS to Doxepin 10mg QHS, this may also improve her GERD symptoms  - Patient had " been taking 2 0.5mg tablets of Xanax approximately 2 per week. In order to decrease number of tablets, will change prescription to 1mg Daily PRN severe anxiety.        Discussed diagnosis, risks and benefits of proposed treatment above vs alternative treatments vs no treatment, and potential side effects of these treatments. The patient expresses understanding of the above and displays the capacity to agree with this treatment given said understanding. Patient also agrees that, currently, the benefits outweigh the risks and would like to pursue treatment at this time.     Return to clinic 2-3 months    Bam Adams MD  [facility]  PGY III

## 2018-03-20 ENCOUNTER — OFFICE VISIT (OUTPATIENT)
Dept: PSYCHIATRY | Facility: CLINIC | Age: 33
End: 2018-03-20
Payer: COMMERCIAL

## 2018-03-20 VITALS
SYSTOLIC BLOOD PRESSURE: 137 MMHG | DIASTOLIC BLOOD PRESSURE: 94 MMHG | HEIGHT: 67 IN | BODY MASS INDEX: 39.12 KG/M2 | HEART RATE: 101 BPM | WEIGHT: 249.25 LBS

## 2018-03-20 DIAGNOSIS — F33.41 RECURRENT MAJOR DEPRESSIVE DISORDER, IN PARTIAL REMISSION: ICD-10-CM

## 2018-03-20 DIAGNOSIS — F41.1 GAD (GENERALIZED ANXIETY DISORDER): Primary | ICD-10-CM

## 2018-03-20 PROCEDURE — 99213 OFFICE O/P EST LOW 20 MIN: CPT | Mod: S$GLB,,, | Performed by: PSYCHIATRY & NEUROLOGY

## 2018-03-20 PROCEDURE — 99999 PR PBB SHADOW E&M-EST. PATIENT-LVL III: CPT | Mod: PBBFAC,,, | Performed by: PSYCHIATRY & NEUROLOGY

## 2018-03-20 RX ORDER — BUPROPION HYDROCHLORIDE 75 MG/1
37.5 TABLET ORAL 2 TIMES DAILY
Qty: 30 TABLET | Refills: 3 | Status: SHIPPED | OUTPATIENT
Start: 2018-03-20 | End: 2018-12-24 | Stop reason: SDUPTHER

## 2018-03-20 RX ORDER — FLUOXETINE HYDROCHLORIDE 20 MG/1
60 CAPSULE ORAL DAILY
Qty: 90 CAPSULE | Refills: 3 | Status: SHIPPED | OUTPATIENT
Start: 2018-03-20 | End: 2018-12-24 | Stop reason: SDUPTHER

## 2018-03-20 RX ORDER — QUETIAPINE FUMARATE 50 MG/1
50 TABLET, FILM COATED ORAL NIGHTLY
Qty: 30 TABLET | Refills: 3 | Status: SHIPPED | OUTPATIENT
Start: 2018-03-20 | End: 2018-12-24 | Stop reason: SDUPTHER

## 2018-03-21 NOTE — PROGRESS NOTES
03/21/2018  9:28 AM  Radha Fonseca  [id number]          Psychiatry Clinic Note    SUBJECTIVE  Radha Fonseca is a 32 y.o. old female who presents to clinic today for follow up of Depression, anxiety and insomnia.  She reports that since the last visit she has recently quit her job. She had been thinking about quitting for a while, and while she is sad that she will not be doing the same kind of work, she knows that it was for the best for her, given the stress leanna the [employer]  was putting on everyone. She reports that she and her  are planning to move by the end of the year, possibly to [place]. She reports that she has been doing well despite having crying spells when reminded of her previous job because she had enjoyed it so much in the past. 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 She denies SI. She reports that she reverted back to taking Seroquel 50mg at night for insomnia because it's the only thing that has consistently helped her sleep through the night. Discussed several alternative sleep medications; however, she has tried and failed nearly all of them. She reports that she would prefer to continue taking seroquel, particularly during this period of transition in her life. She denies recent changes in appetite. She denies any complaints today.     PSYCHIATRIC ROS  Denies: delusions, paranoia,  periods of persistently elevated/expansive or irritable mood and/or increased goal directed behavior.    Issues/problems with:   Sleep no  Appetite changes no  Low energy no  Poor concentration no  Psychomotor agitation no  Suicidal ideation no  Depressed mood no  Anhedonia no  Anxiety yes  Worry no  Fear no    CURRENT HOME PSYCHIATRIC MEDICATIONS: Prozac 60mg daily, Seroquel 50mg QHS, Xanax 0.5mg daily PRN, uses 1mg about 2 per week, Wellbutrin 37.5mg BID    Patient reports that She is tolerating medications as prescribed without any adverse side  "effects.       MEDICAL ROS  General ROS: negative for - chills or fever  ENT ROS: negative for - headaches or visual changes  Cardiovascular ROS: negative for - chest pain or palpitations  Respiratory ROS: negative for - cough or shortness of breath  Gastrointestinal ROS: negative for - abdominal pain, constipation or diarrhea, Positive for GERD symptoms  Neurological ROS: negative for - confusion or dizziness  Dermatological ROS: negative for acne and rash  Endocrine ROS: negative for - temperature intolerance or unexpected weight changes      OBJECTIVE    Vitals:    03/20/18 0908   BP: (!) 137/94   Pulse: 101   BP elevated today, higher than on previous visits. Patient agrees to recheck at her pharmacy and will follow-up with PCP if it remains elevated.    MENTAL STATUS EXAM  Appearance: no apparent distress, casually dressed  Behavior/Cooperation/Attitude: calm, cooperative, engaged  Speech: conversational rate/tone/volume  Mood: "ok"  Affect: reactive/appropriate  Thought Process: linear  Thought Content: denies SI/HI/AVH  Sensorium: awake/alert  Orientation: oriented to person/place/day of week/situation  Attention/Memory: recent and remote intact  Calculation/Concentration: intact to conversation  Fund of Knowledge: intact to conversation  Abstraction: intact to conversation  Insight: fair  Judgment: appropriate for setting  Impulse Control: fair  Reliability: fair      AIMS: Score 0/36  Abnormal Involuntary Movement Scale  0-4   Muscles of Facial Expression  0   Lips and Perioral Area  0   Jaw  0   Tongue  0   Upper (arms, wrists, hands, fingers)  0    Lower (legs, knees, ankles, toes)  0   Neck, shoulders, hips  0   Severity of abnormal movements (highest score from questions above)  0    Incapacitation due to abnormal movements  0    Patient's awareness of abnormal movements (rate only patient's report)  0   Current problems with teeth and/or dentures?  No    Does patient usually wear dentures?  No  "           STATUS/PROGRESS Based on the examination today, the patient's problem(s) is/are stable. New problems have not presented today. Co-morbidities are not complicating management of the primary condition. There are not active rule-out diagnoses for this patient at this time.       ASSESSMENT AND PLAN  1) MDD, recurrent, moderate  2) FLAKITA  3) Insomnia  4) h/o IBS    - Continue Prozac 60mg daily  - Continue augmentation with wellbutrin 37.5mg BID  - Continue Seroquel 50mg QHS for insomnia related to anxiety  - Reviewed risks/side effects of atypical antipsychotics up to and including movement disorders/TD. Patient agrees that benefits of sleep outweigh risks at this time.   - Patient has significantly limited her use of xanax and does not require a refill at this time. She has used 3-4 pills since her last visit.          Discussed diagnosis, risks and benefits of proposed treatment above vs alternative treatments vs no treatment, and potential side effects of these treatments. The patient expresses understanding of the above and displays the capacity to agree with this treatment given said understanding. Patient also agrees that, currently, the benefits outweigh the risks and would like to pursue treatment at this time.     Return to clinic 3 months, discussed upcoming resident transition    Bam Adams MD  [facility]  PGY III

## 2018-12-24 ENCOUNTER — OFFICE VISIT (OUTPATIENT)
Dept: PSYCHIATRY | Facility: CLINIC | Age: 33
End: 2018-12-24
Payer: COMMERCIAL

## 2018-12-24 VITALS
WEIGHT: 246.69 LBS | HEIGHT: 67 IN | SYSTOLIC BLOOD PRESSURE: 132 MMHG | DIASTOLIC BLOOD PRESSURE: 88 MMHG | HEART RATE: 102 BPM | BODY MASS INDEX: 38.72 KG/M2

## 2018-12-24 DIAGNOSIS — F41.1 GAD (GENERALIZED ANXIETY DISORDER): ICD-10-CM

## 2018-12-24 DIAGNOSIS — F33.41 RECURRENT MAJOR DEPRESSIVE DISORDER, IN PARTIAL REMISSION: ICD-10-CM

## 2018-12-24 PROCEDURE — 3008F BODY MASS INDEX DOCD: CPT | Mod: CPTII,S$GLB,, | Performed by: STUDENT IN AN ORGANIZED HEALTH CARE EDUCATION/TRAINING PROGRAM

## 2018-12-24 PROCEDURE — 99999 PR PBB SHADOW E&M-EST. PATIENT-LVL III: CPT | Mod: PBBFAC,,, | Performed by: STUDENT IN AN ORGANIZED HEALTH CARE EDUCATION/TRAINING PROGRAM

## 2018-12-24 PROCEDURE — 99214 OFFICE O/P EST MOD 30 MIN: CPT | Mod: S$GLB,,, | Performed by: STUDENT IN AN ORGANIZED HEALTH CARE EDUCATION/TRAINING PROGRAM

## 2018-12-24 RX ORDER — ALPRAZOLAM 1 MG/1
.5-1 TABLET ORAL DAILY PRN
Qty: 30 TABLET | Refills: 2 | Status: SHIPPED | OUTPATIENT
Start: 2018-12-24 | End: 2019-03-25 | Stop reason: SDUPTHER

## 2018-12-24 RX ORDER — BUPROPION HYDROCHLORIDE 75 MG/1
37.5 TABLET ORAL 2 TIMES DAILY
Qty: 30 TABLET | Refills: 3 | Status: SHIPPED | OUTPATIENT
Start: 2018-12-24 | End: 2019-03-25 | Stop reason: ALTCHOICE

## 2018-12-24 RX ORDER — FLUOXETINE HYDROCHLORIDE 40 MG/1
80 CAPSULE ORAL DAILY
Qty: 60 CAPSULE | Refills: 3 | Status: SHIPPED | OUTPATIENT
Start: 2018-12-24 | End: 2019-03-25 | Stop reason: SDUPTHER

## 2018-12-24 RX ORDER — QUETIAPINE FUMARATE 50 MG/1
50 TABLET, FILM COATED ORAL NIGHTLY
Qty: 30 TABLET | Refills: 3 | Status: SHIPPED | OUTPATIENT
Start: 2018-12-24 | End: 2019-03-25 | Stop reason: SDUPTHER

## 2018-12-31 NOTE — PROGRESS NOTES
Supervising Psychiatry Staff:  I discussed Ms. Fonseca's progress with Dr Quintin Orlando (T[facility]) in regular caseload supervision. I agree with the above interval history, ROS, findings, and plan, which reflect my own.

## 2019-03-25 ENCOUNTER — OFFICE VISIT (OUTPATIENT)
Dept: PSYCHIATRY | Facility: CLINIC | Age: 34
End: 2019-03-25
Payer: COMMERCIAL

## 2019-03-25 VITALS
SYSTOLIC BLOOD PRESSURE: 140 MMHG | WEIGHT: 247.5 LBS | DIASTOLIC BLOOD PRESSURE: 81 MMHG | HEIGHT: 67 IN | BODY MASS INDEX: 38.84 KG/M2 | HEART RATE: 100 BPM

## 2019-03-25 DIAGNOSIS — F41.1 GAD (GENERALIZED ANXIETY DISORDER): ICD-10-CM

## 2019-03-25 DIAGNOSIS — F33.41 RECURRENT MAJOR DEPRESSIVE DISORDER, IN PARTIAL REMISSION: ICD-10-CM

## 2019-03-25 PROCEDURE — 3008F PR BODY MASS INDEX (BMI) DOCUMENTED: ICD-10-PCS | Mod: CPTII,S$GLB,, | Performed by: STUDENT IN AN ORGANIZED HEALTH CARE EDUCATION/TRAINING PROGRAM

## 2019-03-25 PROCEDURE — 3008F BODY MASS INDEX DOCD: CPT | Mod: CPTII,S$GLB,, | Performed by: STUDENT IN AN ORGANIZED HEALTH CARE EDUCATION/TRAINING PROGRAM

## 2019-03-25 PROCEDURE — 99999 PR PBB SHADOW E&M-EST. PATIENT-LVL III: ICD-10-PCS | Mod: PBBFAC,,, | Performed by: STUDENT IN AN ORGANIZED HEALTH CARE EDUCATION/TRAINING PROGRAM

## 2019-03-25 PROCEDURE — 99214 PR OFFICE/OUTPT VISIT, EST, LEVL IV, 30-39 MIN: ICD-10-PCS | Mod: S$GLB,,, | Performed by: STUDENT IN AN ORGANIZED HEALTH CARE EDUCATION/TRAINING PROGRAM

## 2019-03-25 PROCEDURE — 99214 OFFICE O/P EST MOD 30 MIN: CPT | Mod: S$GLB,,, | Performed by: STUDENT IN AN ORGANIZED HEALTH CARE EDUCATION/TRAINING PROGRAM

## 2019-03-25 PROCEDURE — 99999 PR PBB SHADOW E&M-EST. PATIENT-LVL III: CPT | Mod: PBBFAC,,, | Performed by: STUDENT IN AN ORGANIZED HEALTH CARE EDUCATION/TRAINING PROGRAM

## 2019-03-25 RX ORDER — ALPRAZOLAM 1 MG/1
.5-1 TABLET ORAL DAILY PRN
Qty: 30 TABLET | Refills: 2 | Status: SHIPPED | OUTPATIENT
Start: 2019-04-22 | End: 2019-11-08 | Stop reason: SDUPTHER

## 2019-03-25 RX ORDER — FLUOXETINE HYDROCHLORIDE 40 MG/1
80 CAPSULE ORAL DAILY
Qty: 60 CAPSULE | Refills: 3 | Status: SHIPPED | OUTPATIENT
Start: 2019-03-25 | End: 2019-11-08 | Stop reason: SDUPTHER

## 2019-03-25 RX ORDER — QUETIAPINE FUMARATE 50 MG/1
50 TABLET, FILM COATED ORAL NIGHTLY
Qty: 30 TABLET | Refills: 3 | Status: SHIPPED | OUTPATIENT
Start: 2019-03-25 | End: 2019-11-08 | Stop reason: SDUPTHER

## 2019-03-25 RX ORDER — BUPROPION HYDROCHLORIDE 75 MG/1
75 TABLET ORAL DAILY
Qty: 30 TABLET | Refills: 3 | Status: SHIPPED | OUTPATIENT
Start: 2019-03-25 | End: 2019-11-08

## 2019-03-25 NOTE — PROGRESS NOTES
"  03/25/2019  10:12 AM  Radha Fonseca  [id number]    Outpatient Psychiatry Follow-Up Visit (MD/NP)    3/25/2019    Clinical Status of Patient:  Outpatient (Ambulatory)    Chief Complaint:  Radha Fonseca is a 33 y.o. female who presents today for follow-up of depression and anxiety.  Met with patient.      Interval History and Content of Current Session:  Interim Events/Subjective Report/Content of Current Session:     Patient's dose of prozac was increased to 80 mg at last visit. She states she feels she is able to "cope better," and her  has said she is less "reactionary." She states that her mood is about a "5 or 6 out of 10," most of the time. She states she feels her mood is "complacent, frustrated by work, they're changing my position, my day is split between two different jobs, its a toxic environment." She states her job performance has been "fine."    She states that she has started "boxing," for exercise, has been active socially, going to a [employer] with her .    She states her anxiety levels are "higher," due to work. She states she has been taking Xanax, about "2-3 times per week." She states she is trying to drink less caffeine. She states that she has not been sleeping well, "seroquel isn't kicking in as quick." She states that it takes "about an hour," to fall asleep, and wakes up during the night.    She states she is drinking about 1-2x drinks, about 3-4 times per week.      Psychiatric Review Of Systems - Is patient experiencing or having changes in:  sleep: yes  appetite: no  energy/anergy: "probably lower than a normal person, for myself about average."  interest/pleasure/anhedonia: no  anxiety/panic: yes  Impulsive behaviors: no  Paranoia: no  AVH: no        Review of Systems   Review of Systems   Constitutional: Negative for chills and fever.   Respiratory: Negative for shortness of breath.    Cardiovascular: Negative for chest pain and palpitations.   Gastrointestinal: Negative for " constipation, diarrhea, nausea and vomiting.   Skin: Negative for rash.   Neurological: Negative for seizures and loss of consciousness.       Past Medical, Family and Social History: The patient's past medical, family and social history have been reviewed and updated as appropriate within the electronic medical record - see encounter notes.    Social History     Socioeconomic History    Marital status:      Spouse name: Woo    Number of children: 0    Years of education: Not on file    Highest education level: Not on file   Occupational History     Employer: [employer]     Comment: school representative/graduate studies   Social Needs    Financial resource strain: Not on file    Food insecurity:     Worry: Not on file     Inability: Not on file    Transportation needs:     Medical: Not on file     Non-medical: Not on file   Tobacco Use    Smoking status: Never Smoker   Substance and Sexual Activity    Alcohol use: Yes     Alcohol/week: 1.5 oz     Types: 3 drink(s) per week     Comment: max 2 drinks per day, 1-2 x per week    Drug use: No     Comment: prev use of THC, ecstasy - none in several yrs    Sexual activity: Yes     Partners: Male     Birth control/protection: IUD     Comment: no plans to get pregnant in the near future   Lifestyle    Physical activity:     Days per week: Not on file     Minutes per session: Not on file    Stress: Not on file   Relationships    Social connections:     Talks on phone: Not on file     Gets together: Not on file     Attends Hinduism service: Not on file     Active member of club or organization: Not on file     Attends meetings of clubs or organizations: Not on file     Relationship status: Not on file    Intimate partner violence:     Fear of current or ex partner: Not on file     Emotionally abused: Not on file     Physically abused: Not on file     Forced sexual activity: Not on file   Other Topics Concern    Patient feels they ought to cut  "down on drinking/drug use No    Patient annoyed by others criticizing their drinking/drug use No    Patient has felt bad or guilty about drinking/drug use No    Patient has had a drink/used drugs as an eye opener in the AM No   Social History Narrative    Pt born in [place], moved around a lot w  family, 1 older sister.  Parents  when pt was approx 10 yo - this was traumatic bc mom would tell pt too many details about her father cheating.  Pt not close w father any more, he is likely an alcoholic w strong alcoholism on his side of the family.  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   x 1.5 yrs to Woo - he is also starting as a pt here today.  No children, +cats.  No Catholic, no  or legal hx.  Recreational activities include writing, reading, film, arts, travel.       Compliance: yes    Side effects: None    Risk Parameters:  Patient reports no suicidal ideation  Patient reports no homicidal ideation  Patient reports no self-injurious behavior  Patient reports no violent behavior      Exam (detailed: at least 9 elements; comprehensive: all 15 elements)     Vitals:    Vitals:    03/25/19 0933   BP: (!) 140/81   Pulse: 100   Weight: 112.3 kg (247 lb 7.8 oz)   Height: 5' 7" (1.702 m)          CONSTITUTIONAL  General Appearance: in casual dress, NAD, calm, cooperative, appears stated age    MUSCULOSKELETAL  Abnormal Involuntary Movements: no dyskinesia, dystonia  Gait and Station: ambulating without difficutly    PSYCHIATRIC   Level of Consciousness: alert  Orientation: oriented to person, place, situation  Grooming: fair  Psychomotor Behavior: no agitation, retardation  Speech: normal rate, volume, tone  Language: English, no asphasia  Mood: "very tired today"  Affect: full, reactive  Thought Process: linear, logical  Associations: cohesive  Thought Content: denies SI, HI  Memory: intact to recent and remote " events  Attention: not distracted  Fund of Knowledge: appropriate for education level  Insight: fair  Judgment: fair      Assessment and Diagnosis   Status/Progress: Based on the examination today, the patient's problem(s) is/are adequately but not ideally controlled.  New problems have not been presented today.   Co-morbidities are not complicating management of the primary condition.     General Impression:     1) MDD, recurrent, moderate  2) FLAKITA  3) Insomnia           Intervention/Counseling/Treatment Plan     - continue Prozac 80 mg PO qd    - increase Wellubtrin to 75 mg PO qd (patient only taking half a tablet daily currently)    - continue Xanax and Seroquel as prescribed    - Instructed patient to keep all scheduled appointments, take medications as prescribed and abstain from substance abuse. Instructed to call 911 or present to ER for emergency including SI or HI.    - Discussed diagnosis, risks and benefits of proposed treatment above vs alternative treatments vs no treatment, and potential side effects of these treatments. The patient expresses understanding of the above and displays the capacity to agree with this treatment given said understanding. Patient also agrees that, currently, the benefits outweigh the risks and would like to pursue treatment at this time.       Quintin Orlando MD PGY-3    Case discussed with attending, Dr. Lynn, who agrees with A/P as above    Return to Clinic: 2 months

## 2019-11-08 ENCOUNTER — OFFICE VISIT (OUTPATIENT)
Dept: PSYCHIATRY | Facility: CLINIC | Age: 34
End: 2019-11-08
Payer: COMMERCIAL

## 2019-11-08 VITALS
SYSTOLIC BLOOD PRESSURE: 154 MMHG | DIASTOLIC BLOOD PRESSURE: 97 MMHG | HEART RATE: 92 BPM | WEIGHT: 248.38 LBS | HEIGHT: 67 IN | BODY MASS INDEX: 38.98 KG/M2

## 2019-11-08 DIAGNOSIS — F33.41 RECURRENT MAJOR DEPRESSIVE DISORDER, IN PARTIAL REMISSION: ICD-10-CM

## 2019-11-08 DIAGNOSIS — F41.1 GAD (GENERALIZED ANXIETY DISORDER): ICD-10-CM

## 2019-11-08 PROCEDURE — 3008F BODY MASS INDEX DOCD: CPT | Mod: CPTII,S$GLB,, | Performed by: STUDENT IN AN ORGANIZED HEALTH CARE EDUCATION/TRAINING PROGRAM

## 2019-11-08 PROCEDURE — 99213 PR OFFICE/OUTPT VISIT, EST, LEVL III, 20-29 MIN: ICD-10-PCS | Mod: S$GLB,,, | Performed by: STUDENT IN AN ORGANIZED HEALTH CARE EDUCATION/TRAINING PROGRAM

## 2019-11-08 PROCEDURE — 99999 PR PBB SHADOW E&M-EST. PATIENT-LVL II: ICD-10-PCS | Mod: PBBFAC,,, | Performed by: STUDENT IN AN ORGANIZED HEALTH CARE EDUCATION/TRAINING PROGRAM

## 2019-11-08 PROCEDURE — 3008F PR BODY MASS INDEX (BMI) DOCUMENTED: ICD-10-PCS | Mod: CPTII,S$GLB,, | Performed by: STUDENT IN AN ORGANIZED HEALTH CARE EDUCATION/TRAINING PROGRAM

## 2019-11-08 PROCEDURE — 99999 PR PBB SHADOW E&M-EST. PATIENT-LVL II: CPT | Mod: PBBFAC,,, | Performed by: STUDENT IN AN ORGANIZED HEALTH CARE EDUCATION/TRAINING PROGRAM

## 2019-11-08 PROCEDURE — 99213 OFFICE O/P EST LOW 20 MIN: CPT | Mod: S$GLB,,, | Performed by: STUDENT IN AN ORGANIZED HEALTH CARE EDUCATION/TRAINING PROGRAM

## 2019-11-08 RX ORDER — FLUOXETINE HYDROCHLORIDE 40 MG/1
80 CAPSULE ORAL DAILY
Qty: 60 CAPSULE | Refills: 3 | Status: SHIPPED | OUTPATIENT
Start: 2019-11-08 | End: 2020-02-14 | Stop reason: SDUPTHER

## 2019-11-08 RX ORDER — QUETIAPINE FUMARATE 50 MG/1
50 TABLET, FILM COATED ORAL NIGHTLY
Qty: 30 TABLET | Refills: 3 | Status: SHIPPED | OUTPATIENT
Start: 2019-11-08 | End: 2020-02-14 | Stop reason: SDUPTHER

## 2019-11-08 RX ORDER — BUPROPION HYDROCHLORIDE 150 MG/1
150 TABLET ORAL DAILY
Qty: 30 TABLET | Refills: 3 | Status: SHIPPED | OUTPATIENT
Start: 2019-11-08 | End: 2020-02-14 | Stop reason: SDUPTHER

## 2019-11-08 RX ORDER — ALPRAZOLAM 1 MG/1
.5-1 TABLET ORAL DAILY PRN
Qty: 30 TABLET | Refills: 0 | Status: SHIPPED | OUTPATIENT
Start: 2019-11-08 | End: 2020-02-14 | Stop reason: SDUPTHER

## 2019-11-08 NOTE — PROGRESS NOTES
11/08/2019  Radha Fonseca  [id number]    Outpatient Psychiatry Follow-Up Visit (MD)    11/8/2019    Clinical Status of Patient:  Outpatient (Ambulatory)    Chief Complaint:  Radha Fonseca is a 34 y.o. female who presents today for follow-up of depression and anxiety.  Met with patient.      Interval History and Content of Current Session:  Interim Events/Subjective Report/Content of Current Session:   Patient last seen by Dr. Orlando on 3/28/19. Chart Reviewed,  checked.  Patient is a 34 year old female with past psychiatric history of depression and anxiety who presents today for follow up. States since last seen here, she has changed jobs which has caused some stress. Feels more run down, though unsure if it is the new job or the weather. 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 States it initially involved more weekend and evening work but that has decreased this month. Endorses taking  little bit more xanax than typical. Typically 3-4 times a week. States it will normally last about 3 months. States the past 2 weeks, have been rough mood wise but states she is settling into a new routine. States she is trying to get back into boxing classes. States she takes half a seroquel (25 mg now), would feel too sedated otherwise. States she likes the wellbutrin and has taken the 75 mg dosage. States she feels like she is doing better, but is not sure it is the best she can do. Endorses poor concentration which will sometimes improve when she takes a xanax. Denies any anhedonia or PMR. Endorses good compliance with prozac and wellbutrin. States the only side effect she has had has been lowered sex drive. Denies any other side effects. Denies any suicidal or homicidal ideation, plan or intent. Denies AVH. Social alcohol user, denies any other substance use.    Psychiatric Review Of Systems - Is patient experiencing or having changes in:  sleep: better  "with medication, 9  appetite: no  energy/anergy: "not where I like it to be  interest/pleasure/anhedonia: no  anxiety/panic: yes  Impulsive behaviors: no  Paranoia: no  AVH: no        Review of Systems   Review of Systems   Constitutional: Negative for chills and fever.   Respiratory: Negative for shortness of breath.    Cardiovascular: Negative for chest pain and palpitations.   Gastrointestinal: Negative for constipation, diarrhea, nausea and vomiting.   Skin: Negative for rash.   Neurological: Negative for seizures and loss of consciousness.       Past Medical, Family and Social History: The patient's past medical, family and social history have been reviewed and updated as appropriate within the electronic medical record - see encounter notes.    Social History     Socioeconomic History    Marital status:      Spouse name: Woo    Number of children: 0    Years of education: Not on file    Highest education level: Not on file   Occupational History     Employer: [employer]     Comment: school representative/graduate studies   Social Needs    Financial resource strain: Not on file    Food insecurity:     Worry: Not on file     Inability: Not on file    Transportation needs:     Medical: Not on file     Non-medical: Not on file   Tobacco Use    Smoking status: Never Smoker   Substance and Sexual Activity    Alcohol use: Yes     Alcohol/week: 2.5 standard drinks     Types: 3 drink(s) per week     Comment: max 2 drinks per day, 1-2 x per week    Drug use: No     Comment: prev use of THC, ecstasy - none in several yrs    Sexual activity: Yes     Partners: Male     Birth control/protection: IUD     Comment: no plans to get pregnant in the near future   Lifestyle    Physical activity:     Days per week: Not on file     Minutes per session: Not on file    Stress: Not on file   Relationships    Social connections:     Talks on phone: Not on file     Gets together: Not on file     Attends " "Catholic service: Not on file     Active member of club or organization: Not on file     Attends meetings of clubs or organizations: Not on file     Relationship status: Not on file   Other Topics Concern    Patient feels they ought to cut down on drinking/drug use No    Patient annoyed by others criticizing their drinking/drug use No    Patient has felt bad or guilty about drinking/drug use No    Patient has had a drink/used drugs as an eye opener in the AM No   Social History Narrative    Pt born in [place], moved around a lot w  family, 1 older sister.  Parents  when pt was approx 10 yo - this was traumatic bc mom would tell pt too many details about her father cheating.  Pt not close w father any more, he is likely an alcoholic w strong alcoholism on his side of the family.  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   x 1.5 yrs to [place]oo - he is also starting as a pt here today.  No children, +cats.  No Mandaeism, no  or legal hx.  Recreational activities include writing, reading, film, arts, travel.       Compliance: yes    Side effects: decreased libido    Risk Parameters:  Patient reports no suicidal ideation  Patient reports no homicidal ideation  Patient reports no self-injurious behavior  Patient reports no violent behavior      Exam (detailed: at least 9 elements; comprehensive: all 15 elements)     Vitals:    Vitals:    11/08/19 1317   BP: (!) 154/97   Pulse: 92   Weight: 112.6 kg (248 lb 5.6 oz)   Height: 5' 7" (1.702 m)          CONSTITUTIONAL  General Appearance: in casual dress, NAD, calm, cooperative, appears stated age    MUSCULOSKELETAL  Abnormal Involuntary Movements: no dyskinesia, dystonia  Gait and Station: ambulating without difficutly    PSYCHIATRIC   Level of Consciousness: alert  Orientation: oriented to person, place, situation  Grooming: fair  Psychomotor Behavior: no agitation, " "retardation  Speech: normal rate, volume, tone  Language: English, no asphasia  Mood: "okay"  Affect: full, reactive  Thought Process: linear, logical  Associations: cohesive  Thought Content: denies SI, HI  Memory: intact to recent and remote events  Attention: not distracted  Fund of Knowledge: appropriate for education level  Insight: fair  Judgment: fair      Assessment and Diagnosis   Status/Progress: Based on the examination today, the patient's problem(s) is/are adequately but not ideally controlled.  New problems have not been presented today.   Co-morbidities are not complicating management of the primary condition.     General Impression:     1) MDD, recurrent, moderate  2) FLAKITA  3) Insomnia        Intervention/Counseling/Treatment Plan     MDD, FLAKITA  - Continue Prozac 80 mg PO daily  - Increase Wellubtrin to 150 mg PO daily  - Continue Seroquel 50 mg PO qhs for insomnia and SSRI augmentation  - Continue Xanax 0.5-1mg PRN for anxiety.  checked. Patient recently filled prescription, 1 refill provided, states it will be sufficient for next 3 months.     - Instructed patient to keep all scheduled appointments, take medications as prescribed and abstain from substance abuse. Instructed to call 911 or present to ER for emergency including SI or HI.    - Discussed diagnosis, risks and benefits of proposed treatment above vs alternative treatments vs no treatment, and potential side effects of these treatments. The patient expresses understanding of the above and displays the capacity to agree with this treatment given said understanding. Patient also agrees that, currently, the benefits outweigh the risks and would like to pursue treatment at this time.       Case discussed with attending, Dr. Hernandez, who agrees with A/P as above    Return to Clinic: 3 months    Dung Ruiz MD  [facility] PGY3  [facility]    "

## 2020-02-14 ENCOUNTER — OFFICE VISIT (OUTPATIENT)
Dept: PSYCHIATRY | Facility: CLINIC | Age: 35
End: 2020-02-14
Payer: COMMERCIAL

## 2020-02-14 VITALS
HEIGHT: 67 IN | HEART RATE: 97 BPM | SYSTOLIC BLOOD PRESSURE: 152 MMHG | DIASTOLIC BLOOD PRESSURE: 97 MMHG | WEIGHT: 249.44 LBS | BODY MASS INDEX: 39.15 KG/M2

## 2020-02-14 DIAGNOSIS — F33.41 RECURRENT MAJOR DEPRESSIVE DISORDER, IN PARTIAL REMISSION: ICD-10-CM

## 2020-02-14 DIAGNOSIS — F41.1 GAD (GENERALIZED ANXIETY DISORDER): ICD-10-CM

## 2020-02-14 PROCEDURE — 3008F BODY MASS INDEX DOCD: CPT | Mod: CPTII,S$GLB,, | Performed by: STUDENT IN AN ORGANIZED HEALTH CARE EDUCATION/TRAINING PROGRAM

## 2020-02-14 PROCEDURE — 99999 PR PBB SHADOW E&M-EST. PATIENT-LVL II: ICD-10-PCS | Mod: PBBFAC,,, | Performed by: STUDENT IN AN ORGANIZED HEALTH CARE EDUCATION/TRAINING PROGRAM

## 2020-02-14 PROCEDURE — 99213 OFFICE O/P EST LOW 20 MIN: CPT | Mod: S$GLB,,, | Performed by: STUDENT IN AN ORGANIZED HEALTH CARE EDUCATION/TRAINING PROGRAM

## 2020-02-14 PROCEDURE — 99213 PR OFFICE/OUTPT VISIT, EST, LEVL III, 20-29 MIN: ICD-10-PCS | Mod: S$GLB,,, | Performed by: STUDENT IN AN ORGANIZED HEALTH CARE EDUCATION/TRAINING PROGRAM

## 2020-02-14 PROCEDURE — 99999 PR PBB SHADOW E&M-EST. PATIENT-LVL II: CPT | Mod: PBBFAC,,, | Performed by: STUDENT IN AN ORGANIZED HEALTH CARE EDUCATION/TRAINING PROGRAM

## 2020-02-14 PROCEDURE — 3008F PR BODY MASS INDEX (BMI) DOCUMENTED: ICD-10-PCS | Mod: CPTII,S$GLB,, | Performed by: STUDENT IN AN ORGANIZED HEALTH CARE EDUCATION/TRAINING PROGRAM

## 2020-02-14 RX ORDER — ALPRAZOLAM 1 MG/1
.5-1 TABLET ORAL DAILY PRN
Qty: 30 TABLET | Refills: 0 | Status: SHIPPED | OUTPATIENT
Start: 2020-02-14 | End: 2021-01-20

## 2020-02-14 RX ORDER — QUETIAPINE FUMARATE 50 MG/1
50 TABLET, FILM COATED ORAL NIGHTLY
Qty: 30 TABLET | Refills: 3 | Status: SHIPPED | OUTPATIENT
Start: 2020-02-14 | End: 2020-06-05 | Stop reason: SDUPTHER

## 2020-02-14 RX ORDER — FLUOXETINE HYDROCHLORIDE 40 MG/1
80 CAPSULE ORAL DAILY
Qty: 60 CAPSULE | Refills: 3 | Status: SHIPPED | OUTPATIENT
Start: 2020-02-14 | End: 2020-06-05 | Stop reason: SDUPTHER

## 2020-02-14 RX ORDER — BUPROPION HYDROCHLORIDE 150 MG/1
150 TABLET ORAL DAILY
Qty: 30 TABLET | Refills: 3 | Status: SHIPPED | OUTPATIENT
Start: 2020-02-14 | End: 2020-06-05 | Stop reason: SDUPTHER

## 2020-02-14 NOTE — PROGRESS NOTES
"  02/14/2020  Radha Fonseca  [id number]    Outpatient Psychiatry Follow-Up Visit (MD)    2/14/2020    Clinical Status of Patient:  Outpatient (Ambulatory)    Chief Complaint:  Radha Fonseca is a 34 y.o. female who presents today for follow-up of depression and anxiety.  Met with patient.      Interval History and Content of Current Session:  Interim Events/Subjective Report/Content of Current Session:   Patient last seen by writer on 11/8/19. Chart Reviewed.  Patient is a 34 year old female with past psychiatric history of depression and anxiety who presents today for follow up. States that the new year is a little rough. States she bought a house and moving has been a process. States the  at her work also was fired which was stressful. Endorses taking little bit more xanax than typical for the past two weeks, about 3-4 times a week. Normally will only take it once a week, 30 pills will last about 3 months. States she feels "drained" and tired more often but attributes it to various stressors. Denies any excessive low mood or anxiety. Endorses good sleep and appetite. Denies any anhedonia, poor concentration or PMR. Endorses good compliance with Prozac, Wellbutrin and Seroquel. States the only side effect she has had has been lowered sex drive. Denies any other side effects. Denies any suicidal or homicidal ideation, plan or intent. Denies AVH. Social alcohol user, denies any other substance use. Denies drinking when using xanax. Agreeable to continuing current medication regiment without changes at this time.     Psychiatric Review Of Systems - Is patient experiencing or having changes in:  sleep: better with medication  appetite: no  energy/anergy: "drained"  interest/pleasure/anhedonia: no  anxiety/panic: yes, tolerable   Impulsive behaviors: no  Paranoia: no  AVH: no        Review of Systems   Review of Systems   Constitutional: Negative for chills and fever.   Respiratory: Negative for shortness of " breath.    Cardiovascular: Negative for chest pain and palpitations.   Gastrointestinal: Positive for abdominal pain. Negative for constipation, diarrhea, nausea and vomiting.   Skin: Negative for rash.   Neurological: Negative for seizures and loss of consciousness.       Past Medical, Family and Social History: The patient's past medical, family and social history have been reviewed and updated as appropriate within the electronic medical record - see encounter notes.    Social History     Socioeconomic History    Marital status:      Spouse name: Woo    Number of children: 0    Years of education: Not on file    Highest education level: Not on file   Occupational History     Employer: [employer]     Comment: school representative/graduate studies   Social Needs    Financial resource strain: Not on file    Food insecurity:     Worry: Not on file     Inability: Not on file    Transportation needs:     Medical: Not on file     Non-medical: Not on file   Tobacco Use    Smoking status: Never Smoker   Substance and Sexual Activity    Alcohol use: Yes     Alcohol/week: 2.5 standard drinks     Types: 3 drink(s) per week     Comment: max 2 drinks per day, 1-2 x per week    Drug use: No     Comment: prev use of THC, ecstasy - none in several yrs    Sexual activity: Yes     Partners: Male     Birth control/protection: IUD     Comment: no plans to get pregnant in the near future   Lifestyle    Physical activity:     Days per week: Not on file     Minutes per session: Not on file    Stress: Not on file   Relationships    Social connections:     Talks on phone: Not on file     Gets together: Not on file     Attends Buddhism service: Not on file     Active member of club or organization: Not on file     Attends meetings of clubs or organizations: Not on file     Relationship status: Not on file   Other Topics Concern    Patient feels they ought to cut down on drinking/drug use No    Patient  "annoyed by others criticizing their drinking/drug use No    Patient has felt bad or guilty about drinking/drug use No    Patient has had a drink/used drugs as an eye opener in the AM No   Social History Narrative    Pt born in [place], moved around a lot w  family, 1 older sister.  Parents  when pt was approx 12 yo - this was traumatic bc mom would tell pt too many details about her father cheating.  Pt not close w father any more, he is likely an alcoholic w strong alcoholism on his side of the family.  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   x 1.5 yrs to [place] - he is also starting as a pt here today.  No children, +cats.  No Anabaptist, no  or legal hx.  Recreational activities include writing, reading, film, arts, travel.       Compliance: yes    Side effects: decreased libido    Risk Parameters:  Patient reports no suicidal ideation  Patient reports no homicidal ideation  Patient reports no self-injurious behavior  Patient reports no violent behavior      Exam (detailed: at least 9 elements; comprehensive: all 15 elements)     Vitals:    Vitals:    02/14/20 1614   BP: (!) 152/97   Pulse: 97   Weight: 113.2 kg (249 lb 7.2 oz)   Height: 5' 7" (1.702 m)          CONSTITUTIONAL  General Appearance: in casual dress, NAD, calm, cooperative, appears stated age    MUSCULOSKELETAL  Abnormal Involuntary Movements: no dyskinesia, dystonia  Gait and Station: ambulating without difficutly    PSYCHIATRIC   Level of Consciousness: alert  Orientation: oriented to person, place, situation  Grooming: fair  Psychomotor Behavior: no agitation, retardation  Speech: normal rate, volume, tone  Language: English, no asphasia  Mood: "okay"  Affect: full, reactive  Thought Process: linear, logical  Associations: cohesive  Thought Content: denies SI, HI  Memory: intact to recent and remote events  Attention: not distracted  Fund of " Knowledge: appropriate for education level  Insight: fair  Judgment: fair      Assessment and Diagnosis   Status/Progress: Based on the examination today, the patient's problem(s) is/are adequately but not ideally controlled.  New problems have not been presented today.   Co-morbidities are not complicating management of the primary condition.     General Impression:     1) MDD, recurrent, moderate  2) FLAKITA  3) Insomnia        Intervention/Counseling/Treatment Plan     MDD, FLAKITA  - Continue Prozac 80 mg PO daily  - Continue Wellubtrin to 150 mg PO daily  - Continue Seroquel 50 mg PO qhs for insomnia and SSRI augmentation  - Continue Xanax 0.5-1mg PRN for anxiety. 30 tablets provided, sufficient for 3 months.     - Instructed patient to keep all scheduled appointments, take medications as prescribed and abstain from substance abuse. Instructed to call 911 or present to ER for emergency including SI or HI.    - Discussed diagnosis, risks and benefits of proposed treatment above vs alternative treatments vs no treatment, and potential side effects of these treatments. The patient expresses understanding of the above and displays the capacity to agree with this treatment given said understanding. Patient also agrees that, currently, the benefits outweigh the risks and would like to pursue treatment at this time.       Case discussed with attending, Dr. Hernandez, who agrees with A/P as above    Return to Clinic: 3 months    Dung Ruiz MD  [employer] [facility] PGY3  [facility]

## 2020-05-25 ENCOUNTER — TELEPHONE (OUTPATIENT)
Dept: PSYCHIATRY | Facility: CLINIC | Age: 35
End: 2020-05-25

## 2020-05-25 NOTE — TELEPHONE ENCOUNTER
SW called pt to reschedule appt with Dr. Ruiz. Pt was receptive and confirmed new appt date and time.

## 2020-06-02 ENCOUNTER — TELEPHONE (OUTPATIENT)
Dept: PSYCHIATRY | Facility: CLINIC | Age: 35
End: 2020-06-02

## 2020-06-02 NOTE — TELEPHONE ENCOUNTER
ASTRID called pt to reschedule appt with Dr. Ruiz. Pt did not answer. ASTRID left VM for pt to call back.

## 2020-06-05 ENCOUNTER — OFFICE VISIT (OUTPATIENT)
Dept: PSYCHIATRY | Facility: CLINIC | Age: 35
End: 2020-06-05
Payer: COMMERCIAL

## 2020-06-05 DIAGNOSIS — F41.1 GAD (GENERALIZED ANXIETY DISORDER): ICD-10-CM

## 2020-06-05 DIAGNOSIS — F33.41 RECURRENT MAJOR DEPRESSIVE DISORDER, IN PARTIAL REMISSION: ICD-10-CM

## 2020-06-05 PROCEDURE — 99213 PR OFFICE/OUTPT VISIT, EST, LEVL III, 20-29 MIN: ICD-10-PCS | Mod: 95,,, | Performed by: STUDENT IN AN ORGANIZED HEALTH CARE EDUCATION/TRAINING PROGRAM

## 2020-06-05 PROCEDURE — 99213 OFFICE O/P EST LOW 20 MIN: CPT | Mod: 95,,, | Performed by: STUDENT IN AN ORGANIZED HEALTH CARE EDUCATION/TRAINING PROGRAM

## 2020-06-05 RX ORDER — QUETIAPINE FUMARATE 50 MG/1
50 TABLET, FILM COATED ORAL NIGHTLY
Qty: 30 TABLET | Refills: 3 | Status: SHIPPED | OUTPATIENT
Start: 2020-06-05 | End: 2020-11-04 | Stop reason: ALTCHOICE

## 2020-06-05 RX ORDER — FLUOXETINE HYDROCHLORIDE 40 MG/1
80 CAPSULE ORAL DAILY
Qty: 60 CAPSULE | Refills: 3 | Status: SHIPPED | OUTPATIENT
Start: 2020-06-05 | End: 2020-11-04 | Stop reason: SDUPTHER

## 2020-06-05 RX ORDER — BUPROPION HYDROCHLORIDE 150 MG/1
150 TABLET ORAL DAILY
Qty: 30 TABLET | Refills: 3 | Status: SHIPPED | OUTPATIENT
Start: 2020-06-05 | End: 2020-11-04

## 2020-06-05 NOTE — PROGRESS NOTES
06/05/2020  Radha Fonseca  [id number]    Outpatient Psychiatry Follow-Up Visit (MD)    6/5/2020    Clinical Status of Patient:  Outpatient (Ambulatory)    Chief Complaint:  Radha Fonseca is a 34 y.o. female who presents today for follow-up of depression and anxiety.  Met with patient.      Interval History and Content of Current Session:  Interim Events/Subjective Report/Content of Current Session:   The patient location is: home  The chief complaint leading to consultation is: depression    Visit type: audiovisual    Face to Face time with patient: 20 minutes of total time spent on the encounter, which includes face to face time and non-face to face time preparing to see the patient (eg, review of tests), Obtaining and/or reviewing separately obtained history, Documenting clinical information in the electronic or other health record, Independently interpreting results (not separately reported) and communicating results to the patient/family/caregiver, or Care coordination (not separately reported).   Each patient to whom he or she provides medical services by telemedicine is:  (1) informed of the relationship between the physician and patient and the respective role of any other health care provider with respect to management of the patient; and (2) notified that he or she may decline to receive medical services by telemedicine and may withdraw from such care at any time.      Patient last seen by writer on 2/21/20. Chart Reviewed.  Patient is a 34 year old female with past psychiatric history of depression and anxiety who presents today for follow up. States that all things considered she is doing fine, with the pandemic and the protests. States she bought a house and feels fortunate to have both her and her  working. States she has been a bit stressed because of everything happening and endorses taking little bit more xanax than typical for the past two weeks, about 3-4 times a week. Denies running out  however, states she still has another prescription left. Normally will only take it once a week, 30 pills will last about 3 months. Denies any excessive low mood or anxiety. Endorses fair sleep and appetite. States their has been a day or 2 when she had to take a second dose of seroquel, states it is very rare though. Denies any anhedonia, poor concentration or PMR. Endorses good compliance with Prozac, Wellbutrin and Seroquel. States the only side effect she has had has been lowered sex drive. Denies any other side effects. Denies any suicidal or homicidal ideation, plan or intent. Denies AVH. Social alcohol user, denies any other substance use. Denies drinking when using xanax. Agreeable to continuing current medication regiment without changes at this time.     Psychiatric Review Of Systems - Is patient experiencing or having changes in:  sleep: better with medication  appetite: no  energy/anergy: fair  interest/pleasure/anhedonia: no  anxiety/panic: somewhat  Impulsive behaviors: no  Paranoia: no  AVH: no        Review of Systems   Review of Systems   Constitutional: Negative for chills and fever.   Respiratory: Negative for shortness of breath.    Cardiovascular: Negative for chest pain and palpitations.   Gastrointestinal:  Negative for constipation, diarrhea, nausea and vomiting.   Skin: Negative for rash.   Neurological: Negative for seizures and loss of consciousness.       Past Medical, Family and Social History: The patient's past medical, family and social history have been reviewed and updated as appropriate within the electronic medical record - see encounter notes.    Social History     Socioeconomic History    Marital status:      Spouse name: Woo    Number of children: 0    Years of education: Not on file    Highest education level: Not on file   Occupational History     Employer: [employer]     Comment: school representative/graduate studies   Social Needs    Financial  resource strain: Not on file    Food insecurity:     Worry: Not on file     Inability: Not on file    Transportation needs:     Medical: Not on file     Non-medical: Not on file   Tobacco Use    Smoking status: Never Smoker   Substance and Sexual Activity    Alcohol use: Yes     Alcohol/week: 2.5 standard drinks     Types: 3 drink(s) per week     Comment: max 2 drinks per day, 1-2 x per week    Drug use: No     Comment: prev use of THC, ecstasy - none in several yrs    Sexual activity: Yes     Partners: Male     Birth control/protection: IUD     Comment: no plans to get pregnant in the near future   Lifestyle    Physical activity:     Days per week: Not on file     Minutes per session: Not on file    Stress: Not on file   Relationships    Social connections:     Talks on phone: Not on file     Gets together: Not on file     Attends Christian service: Not on file     Active member of club or organization: Not on file     Attends meetings of clubs or organizations: Not on file     Relationship status: Not on file   Other Topics Concern    Patient feels they ought to cut down on drinking/drug use No    Patient annoyed by others criticizing their drinking/drug use No    Patient has felt bad or guilty about drinking/drug use No    Patient has had a drink/used drugs as an eye opener in the AM No   Social History Narrative    Pt born in [place], moved around a lot w  family, 1 older sister.  Parents  when pt was approx 10 yo - this was traumatic bc mom would tell pt too many details about her father cheating.  Pt not close w father any more, he is likely an alcoholic w strong alcoholism on his side of the family.  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   x 1.5 yrs to Woo - he is also starting as a pt here today.  No children, +cats.  No Protestant, no  or legal hx.  Recreational activities include writing,  "reading, film, arts, travel.       Compliance: yes    Side effects: decreased libido    Risk Parameters:  Patient reports no suicidal ideation  Patient reports no homicidal ideation  Patient reports no self-injurious behavior  Patient reports no violent behavior      Exam (detailed: at least 9 elements; comprehensive: all 15 elements)     Vitals:    There were no vitals filed for this visit.       CONSTITUTIONAL  General Appearance: in casual dress, NAD, calm, cooperative, appears stated age    MUSCULOSKELETAL  Abnormal Involuntary Movements: no dyskinesia, dystonia  Gait and Station: ambulating without difficutly    PSYCHIATRIC   Level of Consciousness: alert  Orientation: oriented to person, place, situation  Grooming: fair  Psychomotor Behavior: no agitation, retardation  Speech: normal rate, volume, tone  Language: English, no asphasia  Mood: "okay"  Affect: full, reactive  Thought Process: linear, logical  Associations: cohesive  Thought Content: denies SI, HI  Memory: intact to recent and remote events  Attention: not distracted  Fund of Knowledge: appropriate for education level  Insight: fair  Judgment: fair      Assessment and Diagnosis   Status/Progress: Based on the examination today, the patient's problem(s) is/are adequately but not ideally controlled.  New problems have not been presented today.   Co-morbidities are not complicating management of the primary condition.     General Impression:     1) MDD, recurrent, moderate  2) FLAKITA  3) Insomnia        Intervention/Counseling/Treatment Plan     MDD, FLAKITA  - Continue Prozac 80 mg PO daily  - Continue Wellubtrin 150 mg PO daily  - Continue Seroquel 50 mg PO qhs for insomnia and SSRI augmentation  - Continue Xanax 0.5-1mg PRN for anxiety. Rarely taken, denies any refills needed at this time, typically 30 pills will last 3-6 months.     - Instructed patient to keep all scheduled appointments, take medications as prescribed and abstain from substance abuse. " Instructed to call 911 or present to ER for emergency including SI or HI.    - Discussed diagnosis, risks and benefits of proposed treatment above vs alternative treatments vs no treatment, and potential side effects of these treatments. The patient expresses understanding of the above and displays the capacity to agree with this treatment given said understanding. Patient also agrees that, currently, the benefits outweigh the risks and would like to pursue treatment at this time.     -Resident transition discussed.       Case discussed with attending, Dr. Hernandez, who agrees with A/P as above    Return to Clinic: 3 months    Dung Ruiz MD  [employer] [facility] PGY3  [facility]

## 2020-07-24 ENCOUNTER — OFFICE VISIT (OUTPATIENT)
Dept: INTERNAL MEDICINE | Facility: CLINIC | Age: 35
End: 2020-07-24
Payer: COMMERCIAL

## 2020-07-24 ENCOUNTER — TELEPHONE (OUTPATIENT)
Dept: NEUROLOGY | Facility: CLINIC | Age: 35
End: 2020-07-24

## 2020-07-24 DIAGNOSIS — G43.009 MIGRAINE WITHOUT AURA AND WITHOUT STATUS MIGRAINOSUS, NOT INTRACTABLE: Primary | ICD-10-CM

## 2020-07-24 PROCEDURE — 99213 PR OFFICE/OUTPT VISIT, EST, LEVL III, 20-29 MIN: ICD-10-PCS | Mod: 95,,, | Performed by: INTERNAL MEDICINE

## 2020-07-24 PROCEDURE — 99213 OFFICE O/P EST LOW 20 MIN: CPT | Mod: 95,,, | Performed by: INTERNAL MEDICINE

## 2020-07-24 RX ORDER — ONDANSETRON 4 MG/1
4 TABLET, ORALLY DISINTEGRATING ORAL EVERY 8 HOURS PRN
Qty: 20 TABLET | Refills: 0 | Status: SHIPPED | OUTPATIENT
Start: 2020-07-24 | End: 2021-05-18 | Stop reason: SDUPTHER

## 2020-07-24 RX ORDER — SUMATRIPTAN SUCCINATE 100 MG/1
TABLET ORAL
Qty: 18 TABLET | Refills: 11 | Status: SHIPPED | OUTPATIENT
Start: 2020-07-24 | End: 2021-10-20 | Stop reason: SDUPTHER

## 2020-07-24 RX ORDER — BUTALBITAL, ACETAMINOPHEN AND CAFFEINE 50; 325; 40 MG/1; MG/1; MG/1
1 TABLET ORAL EVERY 4 HOURS PRN
Qty: 30 TABLET | Refills: 0 | Status: SHIPPED | OUTPATIENT
Start: 2020-07-24 | End: 2020-08-23

## 2020-07-24 NOTE — PROGRESS NOTES
Subjective:       Patient ID: Radha Fonseca is a 35 y.o. female.    Chief Complaint: No chief complaint on file.    This is audio-visual virtual visit for 35 year old lady with bad headaches all week.  Is light sensitive and sometimes cannot get out of the bed.  Has never been dx'd with migraines.  NO visual changes.  Has long history of light sensitivity that leads to bad headaches  Headache sometimes makes her nauseated.  Also has some dizziness with position change    Review of Systems   Constitutional: Positive for activity change. Negative for chills, fatigue, fever and unexpected weight change.   HENT: Negative for congestion, ear pain, hearing loss, nosebleeds, postnasal drip, rhinorrhea, sinus pressure, sore throat and trouble swallowing.    Eyes: Negative.  Negative for discharge and visual disturbance.   Respiratory: Positive for chest tightness. Negative for cough, shortness of breath and wheezing.    Cardiovascular: Negative for chest pain and palpitations.   Gastrointestinal: Negative for abdominal pain, blood in stool, constipation, diarrhea, nausea and vomiting.   Endocrine: Negative for polydipsia and polyuria.   Genitourinary: Negative for difficulty urinating, dysuria, frequency, hematuria, menstrual problem and urgency.   Musculoskeletal: Positive for neck pain. Negative for arthralgias, joint swelling and neck stiffness.   Skin: Negative for rash.   Neurological: Positive for headaches. Negative for dizziness and weakness.   Psychiatric/Behavioral: Positive for dysphoric mood. Negative for confusion and sleep disturbance. The patient is not nervous/anxious.        Objective:      Physical Exam  Constitutional:       General: She is not in acute distress.     Appearance: Normal appearance. She is not ill-appearing.   HENT:      Head: Normocephalic and atraumatic.   Neurological:      Mental Status: She is alert.         Assessment:       1. Migraine without aura and without status migrainosus,  not intractable        Plan:   Diagnoses and all orders for this visit:    Migraine without aura and without status migrainosus, not intractable  -     Ambulatory referral/consult to Neurology; Future    Other orders  -     sumatriptan (IMITREX) 100 MG tablet; 1 tab at onset of headache.  Mat repeat after 2 hours prn.  No more than 2 tabs in 24 hours  -     ondansetron (ZOFRAN-ODT) 4 MG TbDL; Take 1 tablet (4 mg total) by mouth every 8 (eight) hours as needed.  -     butalbital-acetaminophen-caffeine -40 mg (FIORICET, ESGIC) -40 mg per tablet; Take 1 tablet by mouth every 4 (four) hours as needed for Pain.

## 2020-09-16 ENCOUNTER — OFFICE VISIT (OUTPATIENT)
Dept: NEUROLOGY | Facility: CLINIC | Age: 35
End: 2020-09-16
Payer: COMMERCIAL

## 2020-09-16 DIAGNOSIS — G43.009 MIGRAINE WITHOUT AURA AND WITHOUT STATUS MIGRAINOSUS, NOT INTRACTABLE: ICD-10-CM

## 2020-09-16 PROCEDURE — 99203 PR OFFICE/OUTPT VISIT, NEW, LEVL III, 30-44 MIN: ICD-10-PCS | Mod: 95,,, | Performed by: NURSE PRACTITIONER

## 2020-09-16 PROCEDURE — 99203 OFFICE O/P NEW LOW 30 MIN: CPT | Mod: 95,,, | Performed by: NURSE PRACTITIONER

## 2020-09-16 RX ORDER — RIMEGEPANT SULFATE 75 MG/75MG
75 TABLET, ORALLY DISINTEGRATING ORAL ONCE AS NEEDED
Qty: 8 TABLET | Refills: 2 | Status: SHIPPED | OUTPATIENT
Start: 2020-09-16 | End: 2020-09-16

## 2020-09-16 RX ORDER — TOPIRAMATE SPINKLE 15 MG/1
15 CAPSULE ORAL SEE ADMIN INSTRUCTIONS
Qty: 90 CAPSULE | Refills: 2 | Status: SHIPPED | OUTPATIENT
Start: 2020-09-16 | End: 2020-11-18 | Stop reason: DRUGHIGH

## 2020-09-16 NOTE — LETTER
September 16, 2020      Paulina Rebolledo MD  [address]  [facility] [address]           [facility]  [address]  [facility] [address]  Phone: [phone]  Fax: [phone]          Patient: Radha Fonseca   MR Number: [id number]   YOB: 1985   Date of Visit: 9/16/2020       Dear Dr. Paulina Rebolledo:    Thank you for referring Radha Fonseca to me for evaluation. Attached you will find relevant portions of my assessment and plan of care.    If you have questions, please do not hesitate to call me. I look forward to following Radha Fonseca along with you.    Sincerely,    Kerrie Pepe, NP    Enclosure  CC:  No Recipients    If you would like to receive this communication electronically, please contact [email or web address] or [phone] to request more information on [employer] access.    For providers and/or their staff who would like to refer a patient to [facility], please contact us through our one-stop-shop provider referral line, [facility], [place], at [phone].    If you feel you have received this communication in error or would no longer like to receive these types of communications, please e-mail [email or web address]

## 2020-09-16 NOTE — PROGRESS NOTES
"The patient location is home  The chief complaint leading to consultation is: evaluation headache    Visit type: TELE AUDIOVISUAL:[id number]    Face to Face time with patient:35 minutes of total time spent on the encounter, which includes face to face time and non-face to face time preparing to see the patient (eg, review of tests), Obtaining and/or reviewing separately obtained history, Documenting clinical information in the electronic or other health record, Independently interpreting results (not separately reported) and communicating results to the patient/family/caregiver, or Care coordination (not separately reported).   Each patient to whom he or she provides medical services by telemedicine is:  (1) informed of the relationship between the physician and patient and the respective role of any other health care provider with respect to management of the patient; and (2) notified that he or she may decline to receive medical services by telemedicine and may withdraw from such care at any time.    REFERRING PROVIDER: Dr. Rebolledo  REASON FOR CONSULT: Headaches     35/F with chronic headaches since 2008 in grad school. She got glasses and they improved up until ~ 2014. They are frequent during summer months April- sept or Oct when days are longer) occurring up to 2-3x/week almost daily at times. +bright light trigger. ~4-5x/week havingdull frontal pain may or not be relieved with sinus med or ibuprofen or tylenol. Usually these are non-debilitating and denies nauseas. Occasionally has "migraines" which can be shooting pain down middle of her head or one side or the other with associated nausea, and osmo/photo/phonophobia. She will lock herself in dark quiet room. No migraines last month. Red wine, computer use excessive are known triggers. She denies acute neurological or unilateral autonomic deficit.  Denies auras.  Activity can worsen pain, rest improve.     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 No recurrent double vision while running errands at store/occured either before or during headache.  Otherwise a balance review of 10-systems is negative.       PHYSICAL EXAM:   General: W/D,WN, well groomed  There were no vitals taken for this visit.  Neurological: Awake, alert, oriented x 3. Speech fluent, no dysarthria. Good attention span. Good fund of knowledge.   no ptosis, nystagmus, EOM palsy. No facial asymmetry. Good hearing bilaterally. Good shoulder shrug    IMPRESSION:   Chronic migraines w/o aura  Anxiety, stable    PLAN   topamax 15mg cap qhs x 5-7, then 30mg qhs x 7d and if remain unimproved 45mg qhs thereafter. Discussed s/e and purpose (plan taper off late fall)  Imitrex 100mg 1/2-1 tab PO q2h prn, max 200mg/24h   Alternatively nurtec odt 75mg qd prn  zofran odt prn  Encouraged to eat regular meals, get adequate sleep, avoid known triggers, and reduce stressors    RTC 2 month, sooner if needed. Instructed how to keep HA diaries accurate monitoring

## 2020-10-08 ENCOUNTER — TELEPHONE (OUTPATIENT)
Dept: NEUROLOGY | Facility: CLINIC | Age: 35
End: 2020-10-08

## 2020-10-08 NOTE — TELEPHONE ENCOUNTER
----- Message from Michelle Coy sent at 10/8/2020 11:49 AM CDT -----  Regarding: PA on medication  Type:  Pharmacy Calling to Clarify an RX    Name of Caller: Isael from [facility]     Pharmacy Name: [facility]     Prescription Name: nurtec     What do they need to clarify? Isael from [facility] called to check on the status of a PA for the medication: nurtec     Can you be contacted via [facility]?call back     Best Call Back Number: [phone] reference #:[id number]

## 2020-11-04 ENCOUNTER — OFFICE VISIT (OUTPATIENT)
Dept: PSYCHIATRY | Facility: CLINIC | Age: 35
End: 2020-11-04
Payer: COMMERCIAL

## 2020-11-04 VITALS
DIASTOLIC BLOOD PRESSURE: 99 MMHG | HEART RATE: 95 BPM | WEIGHT: 255.38 LBS | BODY MASS INDEX: 40.08 KG/M2 | SYSTOLIC BLOOD PRESSURE: 144 MMHG | HEIGHT: 67 IN

## 2020-11-04 DIAGNOSIS — F33.41 RECURRENT MAJOR DEPRESSIVE DISORDER, IN PARTIAL REMISSION: Primary | ICD-10-CM

## 2020-11-04 DIAGNOSIS — F41.1 GAD (GENERALIZED ANXIETY DISORDER): ICD-10-CM

## 2020-11-04 PROCEDURE — 99999 PR PBB SHADOW E&M-EST. PATIENT-LVL III: ICD-10-PCS | Mod: PBBFAC,,, | Performed by: STUDENT IN AN ORGANIZED HEALTH CARE EDUCATION/TRAINING PROGRAM

## 2020-11-04 PROCEDURE — 99214 PR OFFICE/OUTPT VISIT, EST, LEVL IV, 30-39 MIN: ICD-10-PCS | Mod: S$GLB,,, | Performed by: STUDENT IN AN ORGANIZED HEALTH CARE EDUCATION/TRAINING PROGRAM

## 2020-11-04 PROCEDURE — 99999 PR PBB SHADOW E&M-EST. PATIENT-LVL III: CPT | Mod: PBBFAC,,, | Performed by: STUDENT IN AN ORGANIZED HEALTH CARE EDUCATION/TRAINING PROGRAM

## 2020-11-04 PROCEDURE — 3008F PR BODY MASS INDEX (BMI) DOCUMENTED: ICD-10-PCS | Mod: CPTII,S$GLB,, | Performed by: STUDENT IN AN ORGANIZED HEALTH CARE EDUCATION/TRAINING PROGRAM

## 2020-11-04 PROCEDURE — 99214 OFFICE O/P EST MOD 30 MIN: CPT | Mod: S$GLB,,, | Performed by: STUDENT IN AN ORGANIZED HEALTH CARE EDUCATION/TRAINING PROGRAM

## 2020-11-04 PROCEDURE — 3008F BODY MASS INDEX DOCD: CPT | Mod: CPTII,S$GLB,, | Performed by: STUDENT IN AN ORGANIZED HEALTH CARE EDUCATION/TRAINING PROGRAM

## 2020-11-04 RX ORDER — HYDROXYZINE PAMOATE 25 MG/1
25 CAPSULE ORAL DAILY PRN
Qty: 30 CAPSULE | Refills: 0 | Status: SHIPPED | OUTPATIENT
Start: 2020-11-04 | End: 2020-12-04

## 2020-11-04 RX ORDER — FLUOXETINE HYDROCHLORIDE 40 MG/1
80 CAPSULE ORAL DAILY
Qty: 60 CAPSULE | Refills: 3 | Status: SHIPPED | OUTPATIENT
Start: 2020-11-04 | End: 2021-01-20

## 2020-11-04 RX ORDER — BUPROPION HYDROCHLORIDE 300 MG/1
300 TABLET ORAL DAILY
Qty: 30 TABLET | Refills: 0 | Status: SHIPPED | OUTPATIENT
Start: 2020-11-04 | End: 2021-01-20

## 2020-11-04 NOTE — PROGRESS NOTES
11/4/2020 9:39 AM   Radha Fonseca   1985   [id number]           OUTPATIENT PSYCHIATRY FOLLOW- UP VISIT    Reason for Encounter:  Radha Fonseca, a 35 y.o. female,who presents today for follow up of depression and anxiety.  Met with patient who is a transfer of care from Dr. Ruiz last seen on 6/5/2020.     Interval History and Content of Current Session:    Today,  Pt reports that she's not doing great. She states that that longer that we are in quarantine the harder it is for her to feel like her mood is better. Reports low energy levels, poor concentration and low motivation. 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 She reports that she is feeling isolated during the quarantine away from friends and family. She reports that the PRN Seroquel makes her feel tired, and drowsy during the day. She does notice that she has more energy those nights that she doesn't take them. States that the usually takes it about 3-4 times a month, and if she's having a bad week she's take it 2-3 times a week. Endorses passive SI, but denies active plans. Denies HI/AVH.                    Social stressors:  work    Psychiatric Review Of Systems - Is patient experiencing or having changes in:    Symptoms of siobahn or hypomania: Symptoms of psychosis:  Sleep: , maintenance      Risk Parameters:  Patient reports no suicidal ideation  Patient reports no homicidal ideation  Patient reports no self-injurious behavior  Patient reports no violent behavior    Psychotropic medication review  Previous Trials-  Trazodone (didn't like SE)    Current meds-  Prozac 80 mg PO daily  Wellubtrin 150 mg PO daily  Seroquel 50 mg PO qhs for insomnia and SSRI augmentation  Xanax 0.5-1mg PRN for anxiety.     Compliance: yes    Side effects: decreased libido      Review of Systems     Past Medical, Family and Social History: The patient's past medical, family and social history have been reviewed and updated as appropriate within the electronic  "medical record - see encounter notes.    Medical Review of Symptoms  History obtained from the patient   General : NO chills or fever   Respiratory: NO cough, shortness of breath   Cardiovascular: NO chest pain, palpitations or racing heart   Gastrointestinal: NO nausea, vomiting, constipation or diarrhea   Neurological: NO confusion, dizziness, headaches or tremors   Psychiatric: please see HPI     Objective     ALL MEDICATIONS:    Current Outpatient Medications:     ALPRAZolam (XANAX) 1 MG tablet, Take 0.5-1 tablets (0.5-1 mg total) by mouth daily as needed for Anxiety., Disp: 30 tablet, Rfl: 0    buPROPion (WELLBUTRIN XL) 150 MG TB24 tablet, Take 1 tablet (150 mg total) by mouth once daily., Disp: 30 tablet, Rfl: 3    FLUoxetine 40 MG capsule, Take 2 capsules (80 mg total) by mouth once daily., Disp: 60 capsule, Rfl: 3    ondansetron (ZOFRAN-ODT) 4 MG TbDL, Take 1 tablet (4 mg total) by mouth every 8 (eight) hours as needed., Disp: 20 tablet, Rfl: 0    QUEtiapine (SEROQUEL) 50 MG tablet, Take 1 tablet (50 mg total) by mouth every evening., Disp: 30 tablet, Rfl: 3    sumatriptan (IMITREX) 100 MG tablet, 1 tab at onset of headache.  Mat repeat after 2 hours prn.  No more than 2 tabs in 24 hours, Disp: 18 tablet, Rfl: 11    topiramate (TOPAMAX) 15 MG capsule, Take 1 capsule (15 mg total) by mouth As instructed. 1 cap qd 5-7d then 2 caps x5-7d then 3 caps qd thereafter if symptoms remain unimproved., Disp: 90 capsule, Rfl: 2    ALLERGIES:  Review of patient's allergies indicates:   Allergen Reactions    Pertussis vaccines      Pt had "bad reaction" when she received a vaccination, per her mother       RELEVANT LABS/STUDIES:    No results found for: WBC, HGB, HCT, MCV, PLT  BMP  No results found for: NA, K, CL, CO2, BUN, CREATININE, CALCIUM, ANIONGAP, ESTGFRAFRICA, EGFRNONAA  No results found for: ALT, AST, GGT, ALKPHOS, BILITOT  No results found for: TSH  No results found for: LABA1C, " "HGBA1C    Constitutional  Vitals:  Most recent vital signs, dated less than 90 days prior to this appointment, were reviewed.    Vitals:    11/04/20 0934   BP: (!) 144/99   Pulse: 95   Weight: 115.9 kg (255 lb 6.5 oz)   Height: 5' 7" (1.702 m)            PHYSICAL EXAM  General: well developed, well nourished  Neurologic:   Gait: Normal   Psychomotor signs:  No involuntary movements or tremor  AIMS: none    PSYCHIATRIC EXAM:     Mental Status Exam:  Appearance: unremarkable, age appropriate, overweight  Behavior/Cooperation: normal, cooperative, eye contact normal  Speech: normal rate, normal pitch, normal volume, emotional tone at times  Language: uses words appropriately; NO aphasia or dysarthria  Mood: depressed   Affect: tearful mood-congruent,   Thought Process: normal and logical  Thought Content: Passive SI without active plan, no HI/AVH.  Level of Consciousness: Alert and Oriented x3  Memory:  Intact  Attention/concentration: appropriate for age/education.   Fund of Knowledge: appears adequate  Insight: fair  Judgment: fair    Assessment and Diagnosis   Status/Progress: Based on the examination today, the patient's problem(s) is/are inadequately controlled.  New problems have not been presented today.   Co-morbidities are not complicating management of the primary condition.  There are no active rule-out diagnoses for this patient at this time.     General Impression:       ICD-10-CM ICD-9-CM   1. Recurrent major depressive disorder, in partial remission  F33.41 296.35   2. FLAKITA (generalized anxiety disorder)  F41.1 300.02       Intervention/Counseling/Treatment Plan   · Medication Management: -  ? Continue Prozac 80 mg PO daily  ? Increase Wellbutrin XL to 300 mg PO daily  ? Start Vistaril 25 mg Daily for anxiety/insomnia  ? Discontinue Seroquel 50 mg PO qhs for insomnia and SSRI augmentation  ? Continue Xanax 0.5-1mg PRN for anxiety. Rarely taken, denies any refills needed at this time, typically 30 pills " will last 3-6 months.     · Labs: reviewed most recent  · The treatment plan and follow up plan were reviewed with the patient.  · Discussed with patient informed consent, risks vs. benefits, alternative treatments, side effect profile and the inherent unpredictability of individual responses to these treatments. The patient expresses understanding of the above and displays the capacity to agree with this current plan and had no other questions.  · Encouraged Patient to keep future appointments.   · Take medications as prescribed and abstain from substance abuse.   · In the event of an emergency patient was advised to go to the emergency room.    Return to Clinic: 1 month    > than 50% of total time spend on coordination of care and counseling   (which included pts differential diagnosis and prognosis for psychiatric conditions, risks, benefits of treatments, instructions and adherence to treatment plan, risk reduction, reviewing current psychiatric medication regimen, medical problems and social stressors. In addtion to possible discussion with other healthcare provider/s)    Add on Psychotherapy time:0  Total Face time: 25 minutes      Ney Manzano MD  [employer]   11/4/2020 9:39 AM

## 2020-11-18 ENCOUNTER — OFFICE VISIT (OUTPATIENT)
Dept: SLEEP MEDICINE | Facility: CLINIC | Age: 35
End: 2020-11-18
Payer: COMMERCIAL

## 2020-11-18 VITALS
HEART RATE: 90 BPM | DIASTOLIC BLOOD PRESSURE: 98 MMHG | BODY MASS INDEX: 39.39 KG/M2 | WEIGHT: 251 LBS | SYSTOLIC BLOOD PRESSURE: 134 MMHG | HEIGHT: 67 IN

## 2020-11-18 DIAGNOSIS — G43.009 MIGRAINE WITHOUT AURA AND WITHOUT STATUS MIGRAINOSUS, NOT INTRACTABLE: Primary | ICD-10-CM

## 2020-11-18 PROCEDURE — 3008F BODY MASS INDEX DOCD: CPT | Mod: CPTII,S$GLB,, | Performed by: NURSE PRACTITIONER

## 2020-11-18 PROCEDURE — 99214 PR OFFICE/OUTPT VISIT, EST, LEVL IV, 30-39 MIN: ICD-10-PCS | Mod: S$GLB,,, | Performed by: NURSE PRACTITIONER

## 2020-11-18 PROCEDURE — 99999 PR PBB SHADOW E&M-EST. PATIENT-LVL III: CPT | Mod: PBBFAC,,, | Performed by: NURSE PRACTITIONER

## 2020-11-18 PROCEDURE — 99214 OFFICE O/P EST MOD 30 MIN: CPT | Mod: S$GLB,,, | Performed by: NURSE PRACTITIONER

## 2020-11-18 PROCEDURE — 3008F PR BODY MASS INDEX (BMI) DOCUMENTED: ICD-10-PCS | Mod: CPTII,S$GLB,, | Performed by: NURSE PRACTITIONER

## 2020-11-18 PROCEDURE — 99999 PR PBB SHADOW E&M-EST. PATIENT-LVL III: ICD-10-PCS | Mod: PBBFAC,,, | Performed by: NURSE PRACTITIONER

## 2020-11-18 PROCEDURE — 1126F AMNT PAIN NOTED NONE PRSNT: CPT | Mod: S$GLB,,, | Performed by: NURSE PRACTITIONER

## 2020-11-18 PROCEDURE — 1126F PR PAIN SEVERITY QUANTIFIED, NO PAIN PRESENT: ICD-10-PCS | Mod: S$GLB,,, | Performed by: NURSE PRACTITIONER

## 2020-11-18 RX ORDER — TOPIRAMATE 25 MG/1
50-75 TABLET ORAL NIGHTLY
Qty: 90 TABLET | Refills: 5 | Status: SHIPPED | OUTPATIENT
Start: 2020-11-18 | End: 2021-05-18

## 2020-11-18 NOTE — PROGRESS NOTES
"CC: Headaches    Since seen 9/16/20 she has uptitrated topamax to 45mg qd. No worsening of daytime fatigue (attributes to depression). Denies parasthesias. Always dry mouth and no worsening of cognitive difficulties. Headaches less frequent (1-2x/week) and less intense, more mild.Nurtec has been helpful as is Imitrex prn.Sometimes ASA helps alone. Stopped using mouth guard in interim. 6# loss attributes to eating less. Doing "better"        HX 9/2020 vb  35/F with chronic headaches since 2008 in grad school. She got glasses and they improved up until ~ 2014. They are frequent during summer months April- sept or Oct when days are longer) occurring up to 2-3x/week almost daily at times. +bright light trigger. ~4-5x/week havingdull frontal pain may or not be relieved with sinus med or ibuprofen or tylenol. Usually these are non-debilitating and denies nausea. Occasionally has "migraines" which can be shooting pain down middle of her head or one side or the other with associated nausea, and osmo/photo/phonophobia. She will lock herself in dark quiet room. No migraines last month. Red wine, computer use excessive are known triggers. She denies acute neurological or unilateral autonomic deficit.  Denies auras.  Activity can worsen pain, rest improve.     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 Otherwise a balance review of 10-systems is negative.       PHYSICAL EXAM:   General: W/D,obese, well groomed  BP (!) 134/98   Pulse 90   Ht 5' 7" (1.702 m)   Wt 113.9 kg (251 lb)   BMI 39.31 kg/m²   Neurological: Awake, alert, oriented x 3. Speech fluent, no dysarthria. Good attention span. Good fund of knowledge.     IMPRESSION:   Chronic migraines w/o aura, improving  Depression, stable    PLAN   Increase topamax to 50mg and consider 75mg after 2 wks for further improvement of headaches (discussed potential s/e)(if s/e consider CGRP " inhibitor)  Continue Imitrex 100mg 1/2-1 tab PO q2h prn, max 200mg/24h   Continue nurtec odt 75mg qd prn  zofran odt prn  Encouraged to continue to eat regular meals, get adequate sleep, avoid known triggers, and reduce stressors    RTC 26- month, sooner if needed

## 2020-11-30 ENCOUNTER — OFFICE VISIT (OUTPATIENT)
Dept: OPTOMETRY | Facility: CLINIC | Age: 35
End: 2020-11-30
Payer: COMMERCIAL

## 2020-11-30 DIAGNOSIS — H52.13 MYOPIA WITH ASTIGMATISM AND PRESBYOPIA, BILATERAL: ICD-10-CM

## 2020-11-30 DIAGNOSIS — H52.4 MYOPIA WITH ASTIGMATISM AND PRESBYOPIA, BILATERAL: ICD-10-CM

## 2020-11-30 DIAGNOSIS — G43.009 MIGRAINE WITHOUT AURA AND WITHOUT STATUS MIGRAINOSUS, NOT INTRACTABLE: Primary | ICD-10-CM

## 2020-11-30 DIAGNOSIS — H52.203 MYOPIA WITH ASTIGMATISM AND PRESBYOPIA, BILATERAL: ICD-10-CM

## 2020-11-30 PROCEDURE — 92004 COMPRE OPH EXAM NEW PT 1/>: CPT | Mod: S$GLB,,, | Performed by: OPTOMETRIST

## 2020-11-30 PROCEDURE — 92015 DETERMINE REFRACTIVE STATE: CPT | Mod: S$GLB,,, | Performed by: OPTOMETRIST

## 2020-11-30 PROCEDURE — 92015 PR REFRACTION: ICD-10-PCS | Mod: S$GLB,,, | Performed by: OPTOMETRIST

## 2020-11-30 PROCEDURE — 92004 PR EYE EXAM, NEW PATIENT,COMPREHESV: ICD-10-PCS | Mod: S$GLB,,, | Performed by: OPTOMETRIST

## 2020-11-30 PROCEDURE — 99999 PR PBB SHADOW E&M-EST. PATIENT-LVL III: ICD-10-PCS | Mod: PBBFAC,,, | Performed by: OPTOMETRIST

## 2020-11-30 PROCEDURE — 1125F AMNT PAIN NOTED PAIN PRSNT: CPT | Mod: S$GLB,,, | Performed by: OPTOMETRIST

## 2020-11-30 PROCEDURE — 1125F PR PAIN SEVERITY QUANTIFIED, PAIN PRESENT: ICD-10-PCS | Mod: S$GLB,,, | Performed by: OPTOMETRIST

## 2020-11-30 PROCEDURE — 99999 PR PBB SHADOW E&M-EST. PATIENT-LVL III: CPT | Mod: PBBFAC,,, | Performed by: OPTOMETRIST

## 2020-11-30 NOTE — PROGRESS NOTES
HPI     DANIEL 3yrs   Glasses? Yes didn't bring with her   Contacts? no  H/o eye surgery, injections or laser: no  H/o eye injury: no  Known eye conditions? no  Family h/o eye conditions? MGF-glaucoma? MA-AMD?   Eye gtts?no    (-) Flashes (-) Floaters (-) Mucous   (-) Tearing (-) Itching (-) Burning   (+) Headaches worse since Oct , Have transition to migraines/Time varies   but feels that they are more prominent bright light/location varies     (-) Eye Pain/discomfort (-) Irritation   (-) Redness (-) Double vision (-) Blurry vision    Diabetic? (-)  A1c?  (Hemoglobin A1C       Date                     Value               Ref Range             Status                11/16/2020               5.7 (H)             4.0 - 5.6 %           Final              Comment:    ADA Screening Guidelines:  5.7-6.4%  Consistent with   prediabetes  >or=6.5%  Consistent with diabetes  High levels of fetal   hemoglobin interfere with the HbA1C  assay. Heterozygous hemoglobin   variants (HbS, HgC, etc)do  not significantly interfere with this assay.     However, presence of multiple variants may affect accuracy.         01/29/2020               6.4 (H)             4.0 - 5.6 %           Final              Comment:    ADA Screening Guidelines:  5.7-6.4%  Consistent with   prediabetes  >or=6.5%  Consistent with diabetes  High levels of fetal   hemoglobin interfere with the HbA1C  assay. Heterozygous hemoglobin   variants (HbS, HgC, etc)do  not significantly interfere with this assay.     However, presence of multiple variants may affect accuracy.         03/12/2019               6.3 (H)             4.0 - 5.6 %           Final              Comment:    ADA Screening Guidelines:  5.7-6.4%  Consistent with   prediabetes  >or=6.5%  Consistent with diabetes  High levels of fetal   hemoglobin interfere with the HbA1C  assay. Heterozygous hemoglobin   variants (HbS, HgC, etc)do  not significantly interfere with this assay.     However, presence of  multiple variants may affect accuracy.    ----------)          Last edited by aMkenzie Berry on 11/30/2020  2:39 PM. (History)            Assessment /Plan     For exam results, see Encounter Report.    Migraine without aura and without status migrainosus, not intractable    Myopia with astigmatism and presbyopia, bilateral    1. NO ocular pathology found in association. Monitor.   2. SRx released to patient. Patient educated on lens options. Pt prefers glasses for computer usage.   Normal ocular health. RTC 1 year for routine exam.

## 2021-01-19 ENCOUNTER — OFFICE VISIT (OUTPATIENT)
Dept: SLEEP MEDICINE | Facility: CLINIC | Age: 36
End: 2021-01-19
Payer: COMMERCIAL

## 2021-01-19 VITALS
WEIGHT: 255.75 LBS | HEART RATE: 91 BPM | BODY MASS INDEX: 40.14 KG/M2 | DIASTOLIC BLOOD PRESSURE: 92 MMHG | HEIGHT: 67 IN | SYSTOLIC BLOOD PRESSURE: 131 MMHG

## 2021-01-19 DIAGNOSIS — G43.009 MIGRAINE WITHOUT AURA AND WITHOUT STATUS MIGRAINOSUS, NOT INTRACTABLE: Primary | ICD-10-CM

## 2021-01-19 PROCEDURE — 1126F PR PAIN SEVERITY QUANTIFIED, NO PAIN PRESENT: ICD-10-PCS | Mod: S$GLB,,, | Performed by: NURSE PRACTITIONER

## 2021-01-19 PROCEDURE — 99999 PR PBB SHADOW E&M-EST. PATIENT-LVL III: CPT | Mod: PBBFAC,,, | Performed by: NURSE PRACTITIONER

## 2021-01-19 PROCEDURE — 3008F PR BODY MASS INDEX (BMI) DOCUMENTED: ICD-10-PCS | Mod: CPTII,S$GLB,, | Performed by: NURSE PRACTITIONER

## 2021-01-19 PROCEDURE — 3008F BODY MASS INDEX DOCD: CPT | Mod: CPTII,S$GLB,, | Performed by: NURSE PRACTITIONER

## 2021-01-19 PROCEDURE — 1126F AMNT PAIN NOTED NONE PRSNT: CPT | Mod: S$GLB,,, | Performed by: NURSE PRACTITIONER

## 2021-01-19 PROCEDURE — 99213 PR OFFICE/OUTPT VISIT, EST, LEVL III, 20-29 MIN: ICD-10-PCS | Mod: S$GLB,,, | Performed by: NURSE PRACTITIONER

## 2021-01-19 PROCEDURE — 99213 OFFICE O/P EST LOW 20 MIN: CPT | Mod: S$GLB,,, | Performed by: NURSE PRACTITIONER

## 2021-01-19 PROCEDURE — 99999 PR PBB SHADOW E&M-EST. PATIENT-LVL III: ICD-10-PCS | Mod: PBBFAC,,, | Performed by: NURSE PRACTITIONER

## 2021-01-19 RX ORDER — ERENUMAB-AOOE 70 MG/ML
70 INJECTION SUBCUTANEOUS
Qty: 1 ML | Refills: 11 | Status: SHIPPED | OUTPATIENT
Start: 2021-01-19 | End: 2021-05-18 | Stop reason: DRUGHIGH

## 2021-01-19 RX ORDER — UBROGEPANT 100 MG/1
100 TABLET ORAL
Qty: 10 TABLET | Refills: 2 | Status: SHIPPED | OUTPATIENT
Start: 2021-01-19 | End: 2021-05-18 | Stop reason: SDUPTHER

## 2021-01-20 ENCOUNTER — OFFICE VISIT (OUTPATIENT)
Dept: PSYCHIATRY | Facility: CLINIC | Age: 36
End: 2021-01-20
Payer: COMMERCIAL

## 2021-01-20 VITALS
HEART RATE: 98 BPM | DIASTOLIC BLOOD PRESSURE: 84 MMHG | HEIGHT: 67 IN | BODY MASS INDEX: 39.85 KG/M2 | WEIGHT: 253.88 LBS | SYSTOLIC BLOOD PRESSURE: 136 MMHG

## 2021-01-20 DIAGNOSIS — F41.1 GAD (GENERALIZED ANXIETY DISORDER): ICD-10-CM

## 2021-01-20 DIAGNOSIS — F33.41 RECURRENT MAJOR DEPRESSIVE DISORDER, IN PARTIAL REMISSION: Primary | ICD-10-CM

## 2021-01-20 PROCEDURE — 99213 OFFICE O/P EST LOW 20 MIN: CPT | Mod: S$GLB,,, | Performed by: STUDENT IN AN ORGANIZED HEALTH CARE EDUCATION/TRAINING PROGRAM

## 2021-01-20 PROCEDURE — 99213 PR OFFICE/OUTPT VISIT, EST, LEVL III, 20-29 MIN: ICD-10-PCS | Mod: S$GLB,,, | Performed by: STUDENT IN AN ORGANIZED HEALTH CARE EDUCATION/TRAINING PROGRAM

## 2021-01-20 PROCEDURE — 99999 PR PBB SHADOW E&M-EST. PATIENT-LVL III: ICD-10-PCS | Mod: PBBFAC,,, | Performed by: STUDENT IN AN ORGANIZED HEALTH CARE EDUCATION/TRAINING PROGRAM

## 2021-01-20 PROCEDURE — 99999 PR PBB SHADOW E&M-EST. PATIENT-LVL III: CPT | Mod: PBBFAC,,, | Performed by: STUDENT IN AN ORGANIZED HEALTH CARE EDUCATION/TRAINING PROGRAM

## 2021-01-20 RX ORDER — ALPRAZOLAM 1 MG/1
.5-1 TABLET ORAL DAILY PRN
Qty: 30 TABLET | Refills: 0 | Status: SHIPPED | OUTPATIENT
Start: 2021-01-20 | End: 2021-08-27 | Stop reason: SDUPTHER

## 2021-01-20 RX ORDER — BUPROPION HYDROCHLORIDE 300 MG/1
300 TABLET ORAL DAILY
Qty: 30 TABLET | Refills: 2 | Status: SHIPPED | OUTPATIENT
Start: 2021-01-20 | End: 2021-04-21

## 2021-01-20 RX ORDER — FLUOXETINE HYDROCHLORIDE 40 MG/1
80 CAPSULE ORAL DAILY
Qty: 60 CAPSULE | Refills: 2 | Status: SHIPPED | OUTPATIENT
Start: 2021-01-20 | End: 2021-04-21

## 2021-01-26 ENCOUNTER — TELEPHONE (OUTPATIENT)
Dept: PHARMACY | Facility: CLINIC | Age: 36
End: 2021-01-26

## 2021-01-29 ENCOUNTER — TELEPHONE (OUTPATIENT)
Dept: SLEEP MEDICINE | Facility: CLINIC | Age: 36
End: 2021-01-29

## 2021-04-21 ENCOUNTER — OFFICE VISIT (OUTPATIENT)
Dept: PSYCHIATRY | Facility: CLINIC | Age: 36
End: 2021-04-21
Payer: COMMERCIAL

## 2021-04-21 VITALS
HEART RATE: 106 BPM | SYSTOLIC BLOOD PRESSURE: 139 MMHG | DIASTOLIC BLOOD PRESSURE: 98 MMHG | WEIGHT: 257.81 LBS | BODY MASS INDEX: 40.46 KG/M2 | HEIGHT: 67 IN

## 2021-04-21 DIAGNOSIS — F41.1 GAD (GENERALIZED ANXIETY DISORDER): ICD-10-CM

## 2021-04-21 DIAGNOSIS — F33.41 RECURRENT MAJOR DEPRESSIVE DISORDER, IN PARTIAL REMISSION: Primary | ICD-10-CM

## 2021-04-21 PROCEDURE — 99213 OFFICE O/P EST LOW 20 MIN: CPT | Mod: S$GLB,,, | Performed by: STUDENT IN AN ORGANIZED HEALTH CARE EDUCATION/TRAINING PROGRAM

## 2021-04-21 PROCEDURE — 99999 PR PBB SHADOW E&M-EST. PATIENT-LVL III: CPT | Mod: PBBFAC,,, | Performed by: STUDENT IN AN ORGANIZED HEALTH CARE EDUCATION/TRAINING PROGRAM

## 2021-04-21 PROCEDURE — 99999 PR PBB SHADOW E&M-EST. PATIENT-LVL III: ICD-10-PCS | Mod: PBBFAC,,, | Performed by: STUDENT IN AN ORGANIZED HEALTH CARE EDUCATION/TRAINING PROGRAM

## 2021-04-21 PROCEDURE — 99213 PR OFFICE/OUTPT VISIT, EST, LEVL III, 20-29 MIN: ICD-10-PCS | Mod: S$GLB,,, | Performed by: STUDENT IN AN ORGANIZED HEALTH CARE EDUCATION/TRAINING PROGRAM

## 2021-04-21 RX ORDER — BUPROPION HYDROCHLORIDE 300 MG/1
300 TABLET ORAL DAILY
Qty: 30 TABLET | Refills: 2 | Status: SHIPPED | OUTPATIENT
Start: 2021-04-21 | End: 2021-08-27 | Stop reason: SDUPTHER

## 2021-04-21 RX ORDER — QUETIAPINE FUMARATE 50 MG/1
50 TABLET, FILM COATED ORAL NIGHTLY PRN
Qty: 30 TABLET | Refills: 2 | Status: SHIPPED | OUTPATIENT
Start: 2021-04-21 | End: 2021-08-27 | Stop reason: SDUPTHER

## 2021-04-21 RX ORDER — FLUOXETINE HYDROCHLORIDE 40 MG/1
80 CAPSULE ORAL DAILY
Qty: 60 CAPSULE | Refills: 2 | Status: SHIPPED | OUTPATIENT
Start: 2021-04-21 | End: 2021-08-27 | Stop reason: SDUPTHER

## 2021-05-13 ENCOUNTER — TELEPHONE (OUTPATIENT)
Dept: SLEEP MEDICINE | Facility: CLINIC | Age: 36
End: 2021-05-13

## 2021-05-18 ENCOUNTER — OFFICE VISIT (OUTPATIENT)
Dept: SLEEP MEDICINE | Facility: CLINIC | Age: 36
End: 2021-05-18
Payer: COMMERCIAL

## 2021-05-18 VITALS
BODY MASS INDEX: 40.38 KG/M2 | SYSTOLIC BLOOD PRESSURE: 132 MMHG | DIASTOLIC BLOOD PRESSURE: 95 MMHG | HEART RATE: 97 BPM | HEIGHT: 67 IN

## 2021-05-18 DIAGNOSIS — G47.30 SLEEP APNEA, UNSPECIFIED TYPE: Primary | ICD-10-CM

## 2021-05-18 DIAGNOSIS — G43.009 MIGRAINE WITHOUT AURA AND WITHOUT STATUS MIGRAINOSUS, NOT INTRACTABLE: ICD-10-CM

## 2021-05-18 DIAGNOSIS — H53.9 VISION CHANGES: ICD-10-CM

## 2021-05-18 DIAGNOSIS — R51.9 CHRONIC INTRACTABLE HEADACHE, UNSPECIFIED HEADACHE TYPE: ICD-10-CM

## 2021-05-18 DIAGNOSIS — G89.29 CHRONIC INTRACTABLE HEADACHE, UNSPECIFIED HEADACHE TYPE: ICD-10-CM

## 2021-05-18 PROCEDURE — 1126F PR PAIN SEVERITY QUANTIFIED, NO PAIN PRESENT: ICD-10-PCS | Mod: S$GLB,,, | Performed by: NURSE PRACTITIONER

## 2021-05-18 PROCEDURE — 99999 PR PBB SHADOW E&M-EST. PATIENT-LVL III: ICD-10-PCS | Mod: PBBFAC,,, | Performed by: NURSE PRACTITIONER

## 2021-05-18 PROCEDURE — 99999 PR PBB SHADOW E&M-EST. PATIENT-LVL III: CPT | Mod: PBBFAC,,, | Performed by: NURSE PRACTITIONER

## 2021-05-18 PROCEDURE — 1126F AMNT PAIN NOTED NONE PRSNT: CPT | Mod: S$GLB,,, | Performed by: NURSE PRACTITIONER

## 2021-05-18 PROCEDURE — 3008F PR BODY MASS INDEX (BMI) DOCUMENTED: ICD-10-PCS | Mod: CPTII,S$GLB,, | Performed by: NURSE PRACTITIONER

## 2021-05-18 PROCEDURE — 99214 OFFICE O/P EST MOD 30 MIN: CPT | Mod: S$GLB,,, | Performed by: NURSE PRACTITIONER

## 2021-05-18 PROCEDURE — 3008F BODY MASS INDEX DOCD: CPT | Mod: CPTII,S$GLB,, | Performed by: NURSE PRACTITIONER

## 2021-05-18 PROCEDURE — 99214 PR OFFICE/OUTPT VISIT, EST, LEVL IV, 30-39 MIN: ICD-10-PCS | Mod: S$GLB,,, | Performed by: NURSE PRACTITIONER

## 2021-05-18 RX ORDER — UBROGEPANT 100 MG/1
100 TABLET ORAL
Qty: 20 TABLET | Refills: 3 | Status: SHIPPED | OUTPATIENT
Start: 2021-05-18 | End: 2021-07-20 | Stop reason: SDUPTHER

## 2021-05-18 RX ORDER — LOSARTAN POTASSIUM 100 MG/1
100 TABLET ORAL DAILY
COMMUNITY

## 2021-05-18 RX ORDER — ERENUMAB-AOOE 140 MG/ML
140 INJECTION, SOLUTION SUBCUTANEOUS
Qty: 1 ML | Refills: 11 | Status: SHIPPED | OUTPATIENT
Start: 2021-05-18 | End: 2021-10-12 | Stop reason: SDUPTHER

## 2021-05-18 RX ORDER — ONDANSETRON 4 MG/1
4 TABLET, ORALLY DISINTEGRATING ORAL EVERY 6 HOURS PRN
Qty: 30 TABLET | Refills: 3 | Status: SHIPPED | OUTPATIENT
Start: 2021-05-18

## 2021-05-20 ENCOUNTER — TELEPHONE (OUTPATIENT)
Dept: SLEEP MEDICINE | Facility: OTHER | Age: 36
End: 2021-05-20

## 2021-05-27 ENCOUNTER — HOSPITAL ENCOUNTER (OUTPATIENT)
Dept: RADIOLOGY | Facility: OTHER | Age: 36
Discharge: HOME | End: 2021-05-27
Attending: NURSE PRACTITIONER
Payer: COMMERCIAL

## 2021-05-27 DIAGNOSIS — R51.9 CHRONIC INTRACTABLE HEADACHE, UNSPECIFIED HEADACHE TYPE: ICD-10-CM

## 2021-05-27 DIAGNOSIS — G89.29 CHRONIC INTRACTABLE HEADACHE, UNSPECIFIED HEADACHE TYPE: ICD-10-CM

## 2021-05-27 DIAGNOSIS — H53.9 VISION CHANGES: ICD-10-CM

## 2021-05-27 PROCEDURE — 70551 MRI BRAIN STEM W/O DYE: CPT | Mod: TC

## 2021-05-27 PROCEDURE — 70551 MRI BRAIN STEM W/O DYE: CPT | Mod: 26,,, | Performed by: RADIOLOGY

## 2021-05-27 PROCEDURE — 70551 MRI BRAIN WITHOUT CONTRAST: ICD-10-PCS | Mod: 26,,, | Performed by: RADIOLOGY

## 2021-06-16 ENCOUNTER — TELEPHONE (OUTPATIENT)
Dept: SLEEP MEDICINE | Facility: OTHER | Age: 36
End: 2021-06-16

## 2021-06-17 ENCOUNTER — HOSPITAL ENCOUNTER (OUTPATIENT)
Dept: SLEEP MEDICINE | Facility: OTHER | Age: 36
Discharge: HOME | End: 2021-06-17
Attending: NURSE PRACTITIONER
Payer: COMMERCIAL

## 2021-06-17 DIAGNOSIS — G47.33 OSA (OBSTRUCTIVE SLEEP APNEA): ICD-10-CM

## 2021-06-17 DIAGNOSIS — G47.30 SLEEP APNEA, UNSPECIFIED TYPE: ICD-10-CM

## 2021-06-17 PROCEDURE — 95800 PR SLEEP STUDY, UNATTENDED, RECORD HEART RATE/O2 SAT/RESP ANAL/SLEEP TIME: ICD-10-PCS | Mod: 26,,, | Performed by: PSYCHIATRY & NEUROLOGY

## 2021-06-17 PROCEDURE — 95800 SLP STDY UNATTENDED: CPT | Mod: 26,,, | Performed by: PSYCHIATRY & NEUROLOGY

## 2021-06-17 PROCEDURE — 95800 SLP STDY UNATTENDED: CPT

## 2021-06-25 ENCOUNTER — TELEPHONE (OUTPATIENT)
Dept: SLEEP MEDICINE | Facility: CLINIC | Age: 36
End: 2021-06-25

## 2021-06-25 ENCOUNTER — PATIENT MESSAGE (OUTPATIENT)
Dept: SLEEP MEDICINE | Facility: CLINIC | Age: 36
End: 2021-06-25

## 2021-07-20 ENCOUNTER — OFFICE VISIT (OUTPATIENT)
Dept: SLEEP MEDICINE | Facility: CLINIC | Age: 36
End: 2021-07-20
Payer: COMMERCIAL

## 2021-07-20 DIAGNOSIS — G43.009 MIGRAINE WITHOUT AURA AND WITHOUT STATUS MIGRAINOSUS, NOT INTRACTABLE: Primary | ICD-10-CM

## 2021-07-20 DIAGNOSIS — G47.33 OSA (OBSTRUCTIVE SLEEP APNEA): ICD-10-CM

## 2021-07-20 PROCEDURE — 64615 PR CHEMODENERVATION OF MUSCLE FOR CHRONIC MIGRAINE: ICD-10-PCS | Mod: S$GLB,,, | Performed by: NURSE PRACTITIONER

## 2021-07-20 PROCEDURE — 64615 CHEMODENERV MUSC MIGRAINE: CPT | Mod: S$GLB,,, | Performed by: NURSE PRACTITIONER

## 2021-07-20 PROCEDURE — 99499 NO LOS: ICD-10-PCS | Mod: S$GLB,,, | Performed by: NURSE PRACTITIONER

## 2021-07-20 PROCEDURE — 99499 UNLISTED E&M SERVICE: CPT | Mod: S$GLB,,, | Performed by: NURSE PRACTITIONER

## 2021-07-20 RX ORDER — UBROGEPANT 100 MG/1
100 TABLET ORAL
Qty: 20 TABLET | Refills: 3 | Status: SHIPPED | OUTPATIENT
Start: 2021-07-20 | End: 2021-10-12 | Stop reason: SDUPTHER

## 2021-08-27 ENCOUNTER — OFFICE VISIT (OUTPATIENT)
Dept: PSYCHIATRY | Facility: CLINIC | Age: 36
End: 2021-08-27
Payer: COMMERCIAL

## 2021-08-27 VITALS
WEIGHT: 252.56 LBS | SYSTOLIC BLOOD PRESSURE: 125 MMHG | HEART RATE: 94 BPM | BODY MASS INDEX: 39.55 KG/M2 | DIASTOLIC BLOOD PRESSURE: 85 MMHG

## 2021-08-27 DIAGNOSIS — F41.1 GAD (GENERALIZED ANXIETY DISORDER): ICD-10-CM

## 2021-08-27 DIAGNOSIS — F33.41 RECURRENT MAJOR DEPRESSIVE DISORDER, IN PARTIAL REMISSION: Primary | ICD-10-CM

## 2021-08-27 PROCEDURE — 99999 PR PBB SHADOW E&M-EST. PATIENT-LVL II: ICD-10-PCS | Mod: PBBFAC,,, | Performed by: STUDENT IN AN ORGANIZED HEALTH CARE EDUCATION/TRAINING PROGRAM

## 2021-08-27 PROCEDURE — 99999 PR PBB SHADOW E&M-EST. PATIENT-LVL II: CPT | Mod: PBBFAC,,, | Performed by: STUDENT IN AN ORGANIZED HEALTH CARE EDUCATION/TRAINING PROGRAM

## 2021-08-27 PROCEDURE — 99214 PR OFFICE/OUTPT VISIT, EST, LEVL IV, 30-39 MIN: ICD-10-PCS | Mod: S$GLB,,, | Performed by: STUDENT IN AN ORGANIZED HEALTH CARE EDUCATION/TRAINING PROGRAM

## 2021-08-27 PROCEDURE — 99214 OFFICE O/P EST MOD 30 MIN: CPT | Mod: S$GLB,,, | Performed by: STUDENT IN AN ORGANIZED HEALTH CARE EDUCATION/TRAINING PROGRAM

## 2021-08-27 RX ORDER — FLUOXETINE HYDROCHLORIDE 40 MG/1
80 CAPSULE ORAL DAILY
Qty: 60 CAPSULE | Refills: 3 | Status: SHIPPED | OUTPATIENT
Start: 2021-08-27 | End: 2021-12-14 | Stop reason: SDUPTHER

## 2021-08-27 RX ORDER — ALPRAZOLAM 1 MG/1
1 TABLET ORAL DAILY PRN
Qty: 30 TABLET | Refills: 0 | Status: SHIPPED | OUTPATIENT
Start: 2021-08-27 | End: 2021-12-23 | Stop reason: SDUPTHER

## 2021-08-27 RX ORDER — BUPROPION HYDROCHLORIDE 300 MG/1
300 TABLET ORAL DAILY
Qty: 30 TABLET | Refills: 3 | Status: SHIPPED | OUTPATIENT
Start: 2021-08-27 | End: 2021-10-05 | Stop reason: SDUPTHER

## 2021-08-27 RX ORDER — BUPROPION HYDROCHLORIDE 150 MG/1
150 TABLET ORAL DAILY
Qty: 30 TABLET | Refills: 3 | Status: SHIPPED | OUTPATIENT
Start: 2021-08-27 | End: 2021-10-05 | Stop reason: SDUPTHER

## 2021-08-27 RX ORDER — QUETIAPINE FUMARATE 50 MG/1
50 TABLET, FILM COATED ORAL NIGHTLY PRN
Qty: 30 TABLET | Refills: 3 | Status: SHIPPED | OUTPATIENT
Start: 2021-08-27 | End: 2021-12-23 | Stop reason: SDUPTHER

## 2021-10-02 ENCOUNTER — PATIENT MESSAGE (OUTPATIENT)
Dept: SLEEP MEDICINE | Facility: CLINIC | Age: 36
End: 2021-10-02

## 2021-10-05 ENCOUNTER — PATIENT MESSAGE (OUTPATIENT)
Dept: PSYCHIATRY | Facility: CLINIC | Age: 36
End: 2021-10-05

## 2021-10-05 DIAGNOSIS — F33.41 RECURRENT MAJOR DEPRESSIVE DISORDER, IN PARTIAL REMISSION: ICD-10-CM

## 2021-10-05 RX ORDER — BUPROPION HYDROCHLORIDE 300 MG/1
300 TABLET ORAL DAILY
Qty: 30 TABLET | Refills: 2 | Status: SHIPPED | OUTPATIENT
Start: 2021-10-05 | End: 2021-12-23 | Stop reason: SDUPTHER

## 2021-10-05 RX ORDER — BUPROPION HYDROCHLORIDE 150 MG/1
150 TABLET ORAL DAILY
Qty: 30 TABLET | Refills: 2 | Status: SHIPPED | OUTPATIENT
Start: 2021-10-05 | End: 2021-12-23 | Stop reason: SDUPTHER

## 2021-10-06 RX ORDER — RIMEGEPANT SULFATE 75 MG/75MG
75 TABLET, ORALLY DISINTEGRATING ORAL ONCE AS NEEDED
Qty: 8 TABLET | Refills: 5 | Status: SHIPPED | OUTPATIENT
Start: 2021-10-06 | End: 2021-10-20 | Stop reason: SDUPTHER

## 2021-10-12 DIAGNOSIS — G43.009 MIGRAINE WITHOUT AURA AND WITHOUT STATUS MIGRAINOSUS, NOT INTRACTABLE: ICD-10-CM

## 2021-10-12 RX ORDER — ERENUMAB-AOOE 140 MG/ML
140 INJECTION, SOLUTION SUBCUTANEOUS
Qty: 1 ML | Refills: 11 | Status: SHIPPED | OUTPATIENT
Start: 2021-10-12 | End: 2021-10-20 | Stop reason: SDUPTHER

## 2021-10-12 RX ORDER — UBROGEPANT 100 MG/1
100 TABLET ORAL
Qty: 20 TABLET | Refills: 3 | Status: SHIPPED | OUTPATIENT
Start: 2021-10-12 | End: 2021-10-20 | Stop reason: SDUPTHER

## 2021-10-20 ENCOUNTER — OFFICE VISIT (OUTPATIENT)
Dept: SLEEP MEDICINE | Facility: CLINIC | Age: 36
End: 2021-10-20
Payer: COMMERCIAL

## 2021-10-20 DIAGNOSIS — G43.719 INTRACTABLE CHRONIC MIGRAINE WITHOUT AURA AND WITHOUT STATUS MIGRAINOSUS: Primary | ICD-10-CM

## 2021-10-20 DIAGNOSIS — G43.009 MIGRAINE WITHOUT AURA AND WITHOUT STATUS MIGRAINOSUS, NOT INTRACTABLE: ICD-10-CM

## 2021-10-20 PROCEDURE — 4010F PR ACE/ARB THEARPY RXD/TAKEN: ICD-10-PCS | Mod: CPTII,S$GLB,, | Performed by: NURSE PRACTITIONER

## 2021-10-20 PROCEDURE — 99499 NO LOS: ICD-10-PCS | Mod: S$GLB,,, | Performed by: NURSE PRACTITIONER

## 2021-10-20 PROCEDURE — 64615 CHEMODENERV MUSC MIGRAINE: CPT | Mod: S$GLB,,, | Performed by: NURSE PRACTITIONER

## 2021-10-20 PROCEDURE — 64615 PR CHEMODENERVATION OF MUSCLE FOR CHRONIC MIGRAINE: ICD-10-PCS | Mod: S$GLB,,, | Performed by: NURSE PRACTITIONER

## 2021-10-20 PROCEDURE — 99499 UNLISTED E&M SERVICE: CPT | Mod: S$GLB,,, | Performed by: NURSE PRACTITIONER

## 2021-10-20 PROCEDURE — 4010F ACE/ARB THERAPY RXD/TAKEN: CPT | Mod: CPTII,S$GLB,, | Performed by: NURSE PRACTITIONER

## 2021-10-20 RX ORDER — ERENUMAB-AOOE 140 MG/ML
140 INJECTION, SOLUTION SUBCUTANEOUS
Qty: 1 ML | Refills: 11 | Status: SHIPPED | OUTPATIENT
Start: 2021-10-20 | End: 2023-01-31 | Stop reason: ALTCHOICE

## 2021-10-20 RX ORDER — RIMEGEPANT SULFATE 75 MG/75MG
75 TABLET, ORALLY DISINTEGRATING ORAL ONCE AS NEEDED
Qty: 8 TABLET | Refills: 5 | Status: SHIPPED | OUTPATIENT
Start: 2021-10-20 | End: 2021-10-21

## 2021-10-20 RX ORDER — UBROGEPANT 100 MG/1
100 TABLET ORAL
Qty: 20 TABLET | Refills: 3 | Status: SHIPPED | OUTPATIENT
Start: 2021-10-20 | End: 2022-03-29 | Stop reason: RX

## 2021-10-20 RX ORDER — SUMATRIPTAN SUCCINATE 100 MG/1
TABLET ORAL
Qty: 18 TABLET | Refills: 11 | Status: SHIPPED | OUTPATIENT
Start: 2021-10-20 | End: 2023-01-31 | Stop reason: SDUPTHER

## 2021-12-13 ENCOUNTER — PATIENT MESSAGE (OUTPATIENT)
Dept: PSYCHIATRY | Facility: CLINIC | Age: 36
End: 2021-12-13
Payer: COMMERCIAL

## 2021-12-14 DIAGNOSIS — F41.1 GAD (GENERALIZED ANXIETY DISORDER): ICD-10-CM

## 2021-12-14 DIAGNOSIS — F33.41 RECURRENT MAJOR DEPRESSIVE DISORDER, IN PARTIAL REMISSION: ICD-10-CM

## 2021-12-14 RX ORDER — FLUOXETINE HYDROCHLORIDE 40 MG/1
80 CAPSULE ORAL DAILY
Qty: 60 CAPSULE | Refills: 1 | Status: SHIPPED | OUTPATIENT
Start: 2021-12-14 | End: 2021-12-23 | Stop reason: SDUPTHER

## 2021-12-17 ENCOUNTER — TELEPHONE (OUTPATIENT)
Dept: SLEEP MEDICINE | Facility: CLINIC | Age: 36
End: 2021-12-17
Payer: COMMERCIAL

## 2021-12-17 DIAGNOSIS — G43.719 INTRACTABLE CHRONIC MIGRAINE WITHOUT AURA AND WITHOUT STATUS MIGRAINOSUS: Primary | ICD-10-CM

## 2021-12-17 RX ORDER — RIMEGEPANT SULFATE 75 MG/75MG
75 TABLET, ORALLY DISINTEGRATING ORAL ONCE AS NEEDED
Qty: 8 TABLET | Refills: 5 | Status: SHIPPED | OUTPATIENT
Start: 2021-12-17 | End: 2021-12-31

## 2021-12-23 ENCOUNTER — OFFICE VISIT (OUTPATIENT)
Dept: PSYCHIATRY | Facility: CLINIC | Age: 36
End: 2021-12-23
Payer: COMMERCIAL

## 2021-12-23 VITALS
HEART RATE: 99 BPM | DIASTOLIC BLOOD PRESSURE: 78 MMHG | SYSTOLIC BLOOD PRESSURE: 135 MMHG | WEIGHT: 254.06 LBS | BODY MASS INDEX: 39.79 KG/M2

## 2021-12-23 DIAGNOSIS — F33.41 RECURRENT MAJOR DEPRESSIVE DISORDER, IN PARTIAL REMISSION: Primary | ICD-10-CM

## 2021-12-23 DIAGNOSIS — F41.1 GAD (GENERALIZED ANXIETY DISORDER): ICD-10-CM

## 2021-12-23 PROCEDURE — 99999 PR PBB SHADOW E&M-EST. PATIENT-LVL I: CPT | Mod: PBBFAC,,, | Performed by: STUDENT IN AN ORGANIZED HEALTH CARE EDUCATION/TRAINING PROGRAM

## 2021-12-23 PROCEDURE — 99999 PR PBB SHADOW E&M-EST. PATIENT-LVL I: ICD-10-PCS | Mod: PBBFAC,,, | Performed by: STUDENT IN AN ORGANIZED HEALTH CARE EDUCATION/TRAINING PROGRAM

## 2021-12-23 PROCEDURE — 99214 OFFICE O/P EST MOD 30 MIN: CPT | Mod: S$GLB,,, | Performed by: STUDENT IN AN ORGANIZED HEALTH CARE EDUCATION/TRAINING PROGRAM

## 2021-12-23 PROCEDURE — 99214 PR OFFICE/OUTPT VISIT, EST, LEVL IV, 30-39 MIN: ICD-10-PCS | Mod: S$GLB,,, | Performed by: STUDENT IN AN ORGANIZED HEALTH CARE EDUCATION/TRAINING PROGRAM

## 2021-12-23 RX ORDER — FLUOXETINE HYDROCHLORIDE 40 MG/1
80 CAPSULE ORAL DAILY
Qty: 60 CAPSULE | Refills: 5 | Status: SHIPPED | OUTPATIENT
Start: 2021-12-23 | End: 2022-08-26

## 2021-12-23 RX ORDER — ALPRAZOLAM 1 MG/1
1 TABLET ORAL DAILY PRN
Qty: 30 TABLET | Refills: 0 | Status: SHIPPED | OUTPATIENT
Start: 2021-12-23 | End: 2022-08-26 | Stop reason: SDUPTHER

## 2021-12-23 RX ORDER — BUPROPION HYDROCHLORIDE 150 MG/1
150 TABLET ORAL DAILY
Qty: 30 TABLET | Refills: 5 | Status: SHIPPED | OUTPATIENT
Start: 2021-12-23 | End: 2022-11-17 | Stop reason: SDUPTHER

## 2021-12-23 RX ORDER — BUPROPION HYDROCHLORIDE 300 MG/1
300 TABLET ORAL DAILY
Qty: 30 TABLET | Refills: 5 | Status: SHIPPED | OUTPATIENT
Start: 2021-12-23 | End: 2022-11-17 | Stop reason: SDUPTHER

## 2021-12-23 RX ORDER — QUETIAPINE FUMARATE 50 MG/1
50 TABLET, FILM COATED ORAL NIGHTLY PRN
Qty: 30 TABLET | Refills: 5 | Status: SHIPPED | OUTPATIENT
Start: 2021-12-23 | End: 2023-02-10

## 2022-01-12 ENCOUNTER — OFFICE VISIT (OUTPATIENT)
Dept: OPTOMETRY | Facility: CLINIC | Age: 37
End: 2022-01-12
Payer: COMMERCIAL

## 2022-01-12 DIAGNOSIS — H52.13 MYOPIA WITH ASTIGMATISM AND PRESBYOPIA, BILATERAL: ICD-10-CM

## 2022-01-12 DIAGNOSIS — H52.4 MYOPIA WITH ASTIGMATISM AND PRESBYOPIA, BILATERAL: ICD-10-CM

## 2022-01-12 DIAGNOSIS — G43.009 MIGRAINE WITHOUT AURA AND WITHOUT STATUS MIGRAINOSUS, NOT INTRACTABLE: Primary | ICD-10-CM

## 2022-01-12 DIAGNOSIS — H52.203 MYOPIA WITH ASTIGMATISM AND PRESBYOPIA, BILATERAL: ICD-10-CM

## 2022-01-12 PROCEDURE — 99999 PR PBB SHADOW E&M-EST. PATIENT-LVL III: CPT | Mod: PBBFAC,,, | Performed by: OPTOMETRIST

## 2022-01-12 PROCEDURE — 1159F PR MEDICATION LIST DOCUMENTED IN MEDICAL RECORD: ICD-10-PCS | Mod: CPTII,S$GLB,, | Performed by: OPTOMETRIST

## 2022-01-12 PROCEDURE — 92014 PR EYE EXAM, EST PATIENT,COMPREHESV: ICD-10-PCS | Mod: S$GLB,,, | Performed by: OPTOMETRIST

## 2022-01-12 PROCEDURE — 92015 DETERMINE REFRACTIVE STATE: CPT | Mod: S$GLB,,, | Performed by: OPTOMETRIST

## 2022-01-12 PROCEDURE — 1160F RVW MEDS BY RX/DR IN RCRD: CPT | Mod: CPTII,S$GLB,, | Performed by: OPTOMETRIST

## 2022-01-12 PROCEDURE — 4010F ACE/ARB THERAPY RXD/TAKEN: CPT | Mod: CPTII,S$GLB,, | Performed by: OPTOMETRIST

## 2022-01-12 PROCEDURE — 1160F PR REVIEW ALL MEDS BY PRESCRIBER/CLIN PHARMACIST DOCUMENTED: ICD-10-PCS | Mod: CPTII,S$GLB,, | Performed by: OPTOMETRIST

## 2022-01-12 PROCEDURE — 1159F MED LIST DOCD IN RCRD: CPT | Mod: CPTII,S$GLB,, | Performed by: OPTOMETRIST

## 2022-01-12 PROCEDURE — 99999 PR PBB SHADOW E&M-EST. PATIENT-LVL III: ICD-10-PCS | Mod: PBBFAC,,, | Performed by: OPTOMETRIST

## 2022-01-12 PROCEDURE — 4010F PR ACE/ARB THEARPY RXD/TAKEN: ICD-10-PCS | Mod: CPTII,S$GLB,, | Performed by: OPTOMETRIST

## 2022-01-12 PROCEDURE — 92014 COMPRE OPH EXAM EST PT 1/>: CPT | Mod: S$GLB,,, | Performed by: OPTOMETRIST

## 2022-01-12 PROCEDURE — 92015 PR REFRACTION: ICD-10-PCS | Mod: S$GLB,,, | Performed by: OPTOMETRIST

## 2022-01-12 NOTE — PROGRESS NOTES
HPI     DANIEL: 11/20  Chief complaint (CC): Patient hasn't noticed any vision changes. Here for   annual exam.  Glasses? 1 yr. old  Contacts? -  H/o eye surgery, injections or laser: -  H/o eye injury: -  Known eye conditions? -  Family h/o eye conditions? MGF and mother with glaucoma  Eye gtts? Allergy and AT's prn      (-) Flashes (-)  Floaters (-) Mucous   (-)  Tearing (-) Itching (-) Burning   (-) Headaches (-) Eye Pain/discomfort (-) Irritation   (-)  Redness (-) Double vision (-) Blurry vision    Diabetic? -  A1c? -        Last edited by Carlita Mcbride on 1/12/2022  2:14 PM. (History)            Assessment /Plan     For exam results, see Encounter Report.    Migraine without aura and without status migrainosus, not intractable    Myopia with astigmatism and presbyopia, bilateral    1. Pt reports improvement with Botox and changes to medication. No e/o ocular pathology found. Monitor.   2. SRx released to patient. Patient educated on lens options. Normal ocular health. RTC 1 year for routine exam.

## 2022-01-19 ENCOUNTER — PROCEDURE VISIT (OUTPATIENT)
Dept: NEUROLOGY | Facility: CLINIC | Age: 37
End: 2022-01-19
Payer: COMMERCIAL

## 2022-01-19 DIAGNOSIS — G43.719 INTRACTABLE CHRONIC MIGRAINE WITHOUT AURA AND WITHOUT STATUS MIGRAINOSUS: Primary | ICD-10-CM

## 2022-01-19 PROCEDURE — 64615 CHEMODENERV MUSC MIGRAINE: CPT | Mod: S$GLB,,, | Performed by: NURSE PRACTITIONER

## 2022-01-19 PROCEDURE — 64615 PR CHEMODENERVATION OF MUSCLE FOR CHRONIC MIGRAINE: ICD-10-PCS | Mod: S$GLB,,, | Performed by: NURSE PRACTITIONER

## 2022-01-19 NOTE — PROCEDURES
Procedures        Mild OD eyelid ptosis and finds mouth will not open as wide as previously, since about 8/2021. mouth guard was painful     TRIED(topamax, losartan, nurtec, imitrex, lamictal, aimovig)  HST 6/2021: AHI 5(RDI 12)     SH: [facility] a[facility] devt         IMPRESSION/PLAN   Chronic migraines w/o aura, >15 HA days/mo lasting up to 4hrs ,worse with summer months, improvement with botox  SOPHIA, mild    Continue  Ubrelvy 100mg PO q2h prn or nurtec odt 75mg qd prn  Continue zofran 4mg q6h odt prn or imitrex 50-100mg po q2h prn  continue  aimovig 140mg SC q28d  See dental specialist     PROCEDURE NOTE:     After informed consent signed and dated, and risks and benefits of procedure was discussed, BOTOX injection was performed     Frontalis- 15 units on each side   - 5 units on each side   Procerus- 10 units   Occipitalis- 20 units on each side   Temporalis - 25 units on each side   Trapezius- 15 units on each side   Cervical paraspinal muscles- 15 units on each side     Patient tolerated procedure well w/o any complications.

## 2022-01-19 NOTE — PROCEDURES
Procedures      TRIED(topamax, losartan, nurtec, imitrex, lamictal, aimovig)  HST 6/2021: AHI 5(RDI 12)     SH: Antonia aaron devserafin         IMPRESSION/PLAN   Chronic migraines w/o aura, >15 HA days/mo lasting up to 4hrs ,worse with summer months, improvement with botox  SOPHIA, mild  Continue  Ubrelvy 100mg PO q2h prn or nurtec odt 75mg qd prn  Continue zofran 4mg q6h odt prn or imitrex 50-100mg po q2h prn   aimovig 140mg SC q28d     PROCEDURE NOTE:     After informed consent signed and dated, and risks and benefits of procedure was discussed, BOTOX injection was performed     Frontalis- 15 units on each side   - 5 units on each side   Procerus- 10 units   Occipitalis- 20 units on each side   Temporalis - 25 units on each side   Trapezius- 15 units on each side   Cervical paraspinal muscles- 15 units on each side     Patient tolerated procedure well w/o any complications.

## 2022-02-16 ENCOUNTER — TELEPHONE (OUTPATIENT)
Dept: PHARMACY | Facility: CLINIC | Age: 37
End: 2022-02-16
Payer: COMMERCIAL

## 2022-03-02 DIAGNOSIS — G43.009 MIGRAINE WITHOUT AURA AND WITHOUT STATUS MIGRAINOSUS, NOT INTRACTABLE: ICD-10-CM

## 2022-03-02 RX ORDER — RIMEGEPANT SULFATE 75 MG/75MG
TABLET, ORALLY DISINTEGRATING ORAL
Qty: 8 TABLET | Refills: 11 | Status: SHIPPED | OUTPATIENT
Start: 2022-03-02 | End: 2022-03-02

## 2022-03-02 RX ORDER — RIMEGEPANT SULFATE 75 MG/75MG
TABLET, ORALLY DISINTEGRATING ORAL
Qty: 8 TABLET | Refills: 11 | Status: SHIPPED | OUTPATIENT
Start: 2022-03-02 | End: 2022-03-29 | Stop reason: RX

## 2022-03-03 NOTE — TELEPHONE ENCOUNTER
Staff reached out to the pt and she did not answer, so I left a message on her vm to inform her that I spoke to [facility] and they are processing her Rx

## 2022-03-29 ENCOUNTER — TELEPHONE (OUTPATIENT)
Dept: PHARMACY | Facility: CLINIC | Age: 37
End: 2022-03-29
Payer: COMMERCIAL

## 2022-03-29 RX ORDER — RIZATRIPTAN BENZOATE 10 MG/1
10 TABLET ORAL
Qty: 12 TABLET | Refills: 3 | Status: SHIPPED | OUTPATIENT
Start: 2022-03-29 | End: 2022-08-10 | Stop reason: SDUPTHER

## 2022-04-13 ENCOUNTER — TELEPHONE (OUTPATIENT)
Dept: NEUROLOGY | Facility: CLINIC | Age: 37
End: 2022-04-13
Payer: COMMERCIAL

## 2022-04-13 NOTE — TELEPHONE ENCOUNTER
Staff left message on the pt's vm to inform her that Kerrie will not be on clinic next Wednesday and that we have to reschedule her appt

## 2022-04-26 ENCOUNTER — OFFICE VISIT (OUTPATIENT)
Dept: SLEEP MEDICINE | Facility: CLINIC | Age: 37
End: 2022-04-26
Payer: COMMERCIAL

## 2022-04-26 DIAGNOSIS — G43.719 INTRACTABLE CHRONIC MIGRAINE WITHOUT AURA AND WITHOUT STATUS MIGRAINOSUS: Primary | ICD-10-CM

## 2022-04-26 PROCEDURE — 64615 CHEMODENERV MUSC MIGRAINE: CPT | Mod: S$GLB,,, | Performed by: NURSE PRACTITIONER

## 2022-04-26 PROCEDURE — 64615 PR CHEMODENERVATION OF MUSCLE FOR CHRONIC MIGRAINE: ICD-10-PCS | Mod: S$GLB,,, | Performed by: NURSE PRACTITIONER

## 2022-04-26 PROCEDURE — 99499 NO LOS: ICD-10-PCS | Mod: S$GLB,,, | Performed by: NURSE PRACTITIONER

## 2022-04-26 PROCEDURE — 99999 PR PBB SHADOW E&M-EST. PATIENT-LVL I: CPT | Mod: PBBFAC,,, | Performed by: NURSE PRACTITIONER

## 2022-04-26 PROCEDURE — 99499 UNLISTED E&M SERVICE: CPT | Mod: S$GLB,,, | Performed by: NURSE PRACTITIONER

## 2022-04-26 PROCEDURE — 4010F PR ACE/ARB THEARPY RXD/TAKEN: ICD-10-PCS | Mod: CPTII,S$GLB,, | Performed by: NURSE PRACTITIONER

## 2022-04-26 PROCEDURE — 4010F ACE/ARB THERAPY RXD/TAKEN: CPT | Mod: CPTII,S$GLB,, | Performed by: NURSE PRACTITIONER

## 2022-04-26 PROCEDURE — 99999 PR PBB SHADOW E&M-EST. PATIENT-LVL I: ICD-10-PCS | Mod: PBBFAC,,, | Performed by: NURSE PRACTITIONER

## 2022-04-26 NOTE — PROGRESS NOTES
Procedures        TRIED(topamax, losartan, nurtec, imitrex, lamictal, aimovig)  HST 6/2021: AHI 5(RDI 12)     SH: [employer] leadership devserafin         IMPRESSION/PLAN   Chronic migraines w/o aura, >15 HA days/mo lasting up to 4hrs ,worse with summer months, improvement with botox overall some wearing off close to repeat  SOPHIA, mild      Continue zofran 4mg q6h odt prn or imitrex 50-100mg po q2h prn or maxalt 10mg q2hp rn /max 3tab/day  continue  aimovig 140mg SC q28d, notify if wish totrial alternative cgrp inhibitor  See dental specialist     PROCEDURE NOTE:     After informed consent signed and dated, and risks and benefits of procedure was discussed, BOTOX injection was performed     Frontalis- 15 units on each side   - 5 units on each side   Procerus- 10 units   Occipitalis- 20 units on each side   Temporalis - 25 units on each side   Trapezius- 15 units on each side   Cervical paraspinal muscles- 15 units on each side     Patient tolerated procedure well w/o any complications.

## 2022-07-28 ENCOUNTER — PATIENT MESSAGE (OUTPATIENT)
Dept: SLEEP MEDICINE | Facility: CLINIC | Age: 37
End: 2022-07-28
Payer: COMMERCIAL

## 2022-08-10 ENCOUNTER — OFFICE VISIT (OUTPATIENT)
Dept: NEUROLOGY | Facility: CLINIC | Age: 37
End: 2022-08-10
Payer: COMMERCIAL

## 2022-08-10 DIAGNOSIS — G43.719 INTRACTABLE CHRONIC MIGRAINE WITHOUT AURA AND WITHOUT STATUS MIGRAINOSUS: Primary | ICD-10-CM

## 2022-08-10 PROCEDURE — 99999 PR PBB SHADOW E&M-EST. PATIENT-LVL I: CPT | Mod: PBBFAC,,, | Performed by: NURSE PRACTITIONER

## 2022-08-10 PROCEDURE — 99499 NO LOS: ICD-10-PCS | Mod: S$GLB,,, | Performed by: NURSE PRACTITIONER

## 2022-08-10 PROCEDURE — 64615 CHEMODENERV MUSC MIGRAINE: CPT | Mod: S$GLB,,, | Performed by: NURSE PRACTITIONER

## 2022-08-10 PROCEDURE — 4010F ACE/ARB THERAPY RXD/TAKEN: CPT | Mod: CPTII,S$GLB,, | Performed by: NURSE PRACTITIONER

## 2022-08-10 PROCEDURE — 4010F PR ACE/ARB THEARPY RXD/TAKEN: ICD-10-PCS | Mod: CPTII,S$GLB,, | Performed by: NURSE PRACTITIONER

## 2022-08-10 PROCEDURE — 64615 PR CHEMODENERVATION OF MUSCLE FOR CHRONIC MIGRAINE: ICD-10-PCS | Mod: S$GLB,,, | Performed by: NURSE PRACTITIONER

## 2022-08-10 PROCEDURE — 99999 PR PBB SHADOW E&M-EST. PATIENT-LVL I: ICD-10-PCS | Mod: PBBFAC,,, | Performed by: NURSE PRACTITIONER

## 2022-08-10 PROCEDURE — 99499 UNLISTED E&M SERVICE: CPT | Mod: S$GLB,,, | Performed by: NURSE PRACTITIONER

## 2022-08-10 RX ORDER — ATOGEPANT 60 MG/1
60 TABLET ORAL DAILY
Qty: 30 TABLET | Refills: 5 | Status: SHIPPED | OUTPATIENT
Start: 2022-08-10 | End: 2022-12-29

## 2022-08-10 NOTE — PROGRESS NOTES
PROCEDURE NOTE:     After informed consent signed and dated, and risks and benefits of procedure was discussed, BOTOX injection was performed     Frontalis- 15 units on each side   - 5 units on each side   Procerus- 10 units   Occipitalis- 20 units on each side   Temporalis - 25 units on each side   Trapezius- 15 units on each side   Cervical paraspinal muscles- 15 units on each side     Patient tolerated procedure well w/o any complications.     Having 7+ migraines/month on botox and aimovig. More recent month being overdue botox. Overall continues to benefit from botox/less frequent than prior, benefiting    Continue prn imitrex +- trialelyxb or excedrin migraine or maxalt prn  Stop aimovig and begin Qulipta for more optimal control 60mg qd  rtc 3mos

## 2022-08-26 ENCOUNTER — OFFICE VISIT (OUTPATIENT)
Dept: PSYCHIATRY | Facility: CLINIC | Age: 37
End: 2022-08-26
Payer: COMMERCIAL

## 2022-08-26 DIAGNOSIS — F33.41 RECURRENT MAJOR DEPRESSIVE DISORDER, IN PARTIAL REMISSION: Primary | ICD-10-CM

## 2022-08-26 DIAGNOSIS — F41.1 GAD (GENERALIZED ANXIETY DISORDER): ICD-10-CM

## 2022-08-26 PROCEDURE — 4010F PR ACE/ARB THEARPY RXD/TAKEN: ICD-10-PCS | Mod: CPTII,95,, | Performed by: STUDENT IN AN ORGANIZED HEALTH CARE EDUCATION/TRAINING PROGRAM

## 2022-08-26 PROCEDURE — 99215 OFFICE O/P EST HI 40 MIN: CPT | Mod: 95,,, | Performed by: STUDENT IN AN ORGANIZED HEALTH CARE EDUCATION/TRAINING PROGRAM

## 2022-08-26 PROCEDURE — 99215 PR OFFICE/OUTPT VISIT, EST, LEVL V, 40-54 MIN: ICD-10-PCS | Mod: 95,,, | Performed by: STUDENT IN AN ORGANIZED HEALTH CARE EDUCATION/TRAINING PROGRAM

## 2022-08-26 PROCEDURE — 4010F ACE/ARB THERAPY RXD/TAKEN: CPT | Mod: CPTII,95,, | Performed by: STUDENT IN AN ORGANIZED HEALTH CARE EDUCATION/TRAINING PROGRAM

## 2022-08-26 RX ORDER — ALPRAZOLAM 1 MG/1
1 TABLET ORAL DAILY PRN
Qty: 30 TABLET | Refills: 0 | Status: SHIPPED | OUTPATIENT
Start: 2022-08-26 | End: 2022-10-14

## 2022-08-26 RX ORDER — FLUOXETINE HYDROCHLORIDE 40 MG/1
40 CAPSULE ORAL DAILY
Qty: 30 CAPSULE | Refills: 0 | Status: SHIPPED | OUTPATIENT
Start: 2022-08-26 | End: 2023-02-10

## 2022-08-26 RX ORDER — SERTRALINE HYDROCHLORIDE 50 MG/1
100 TABLET, FILM COATED ORAL DAILY
Qty: 60 TABLET | Refills: 0 | Status: SHIPPED | OUTPATIENT
Start: 2022-08-26 | End: 2022-10-14

## 2022-08-26 RX ORDER — FLUOXETINE HYDROCHLORIDE 20 MG/1
20 CAPSULE ORAL DAILY
Qty: 15 CAPSULE | Refills: 0 | Status: SHIPPED | OUTPATIENT
Start: 2022-08-26 | End: 2022-09-10

## 2022-08-26 NOTE — PROGRESS NOTES
OUTPATIENT PSYCHIATRY FOLLOW UP VISIT    ENCOUNTER DATE:  8/26/2022  SITE:  [facility], [address]  LENGTH OF SESSION:  30 minutes    TELE PSYCHIATRY Disclaimer   *The patient was informed despite using HIPPA compliant technology there may be risks including security breach, technological failure, inability to perform a comprehensive physical exam which could delay or prevent an accurate diagnosis, and potential complications from treatment decisions rendered over a telemedical platform. The patient understands and consented to the use of tele-health service as being a safe measure to mitigate during COVID 19 Pandemic .  The patient was also informed of the relationship between the physician and patient and the respective role of any other health care provider with respect to management of the patient; and notified that the pt may decline to receive medical services by telemedicine and may withdraw from such care at any time.     Patient's Current location: [address] [place] [address]  exact physical address for OUTPT visit  In Case of Emergency pts next of kin  Name:Woo Caceres  Phone number:  [phone]  Visit type: Virtual visit with synchronous audio and video  Total time spent with patient: 50     CHIEF COMPLAINT:  Follow up for medication management    HISTORY OF PRESENTING ILLNESS:  Radha Fonseca is a 37 y.o. female with history of MDD, FLAKITA who presents for follow up appointment.      Patient was last seen for appointment in this clinic by Dr. Bronson  eight months ago, during which no changes were made to the medication regimen.     Per last note, current psychotropic medications included:  - Prozac 80 mg PO daily -  - Wellbutrin to 450 mg XL total, 300 + 150 mg XL daily - to target energy/focus  - Seroquel 50 mg PO qhs for insomnia and SSRI augmentation - pt typically takes 25 mg nightly (recent labs A1C, CBC/CMP, lipids 4/2021 WNL - may repeat in future)  - Xanax 1 mg prn for  "anxiety      Interval history as told by Patient     Pt initially sought psychiatric care due to struggles with work-life balance which was leading to depression and anxiety. Treated with Prozac for the last 10 years with little or no changes to her regimen. Today she reports that while the Prozac has been helpful with some of the anxiety and depression, she continues to struggle with periods of "going into a dark place." Reports worrying most of the time. Some reported social anxiety in regards to making phone calls. Describes some thoughts of hopelessness and the thought of dying as "peaceful." However, she states that she does not have any plan to end her life and does not want to die. While she has reported that the Prozac has caused issues with her sex drive, it has now started to become a problem in her marriage. As such, she wishes to make changes to her regimen. Sxs of depression are mainly limited to low energy and above noted hopeless thoughts. Compliant with all medications. No hx of brook or psychosis. Denies AVH. Denies drug or alcohol use.    PSYCHIATRIC REVIEW OF SYSTEMS:    Symptoms of Depression: hopelessness, low energy    Symptoms of Anxiety/ panic attacks: yes    Symptoms of Brook/Hypomania: None    Symptoms of psychosis: None    Sleep: None    Alcohol: No excessive drinking reported    Illicit Drugs: No illicit substance use reported      Risk Parameters:  Patient denies no SI, HI or self-injurious behavior      PSYCHIATRIC MED REVIEW  Current medications:  Prozac 80 mg daily  Wellbutrin 450 mg daily  Seroquel 50 mg nightly  Xanax 0.5 mg prn daily    Current psych meds  Medication side effects:  none  Medication compliance:  Full      PAST PSYCHIATRIC, MEDICAL, AND SOCIAL HISTORY  Medical Hx:  Migraines, HTN  Meds: Losartan, Qulipta, Imitrex PRN    Psychiatric Hx:  Diagnosis: MDD, FLAKITA  Previous medication trials:  Antidepressants   - Trazodone: intolerable side effects   - Effexor, didn't " remember if took   - Prozac, 10 years, previously on it in high school, decreased sex drive in last few years   - Wellbutrin, 2 years   - Seroquel, 5-6 years, initially reported drowsiness  Stimulants  Anxiolytics   - Xanax  Other   - Vistaril  Hospitalizations: No  Suicide attempt: No    Social Hx: Lives with , no kids, masters degree    Family Hx: mother: depression, PTSD      MEDICAL REVIEW OF SYSTEMS:  Complete review of systems performed covering Constitutional, Musculoskeletal, Neurologic.  All systems negative.    ALL MEDICATIONS:    Current Outpatient Medications:     ALPRAZolam (XANAX) 1 MG tablet, Take 1 tablet (1 mg total) by mouth daily as needed for Anxiety., Disp: 30 tablet, Rfl: 0    atogepant (QULIPTA) 60 mg Tab, Take 60 mg by mouth once daily., Disp: 30 tablet, Rfl: 5    buPROPion (WELLBUTRIN XL) 150 MG TB24 tablet, Take 1 tablet (150 mg total) by mouth once daily., Disp: 30 tablet, Rfl: 5    buPROPion (WELLBUTRIN XL) 300 MG 24 hr tablet, Take 1 tablet (300 mg total) by mouth once daily., Disp: 30 tablet, Rfl: 5    erenumab-aooe (AIMOVIG AUTOINJECTOR) 140 mg/mL autoinjector, Inject 1 mL (140 mg total) into the skin every 28 days., Disp: 1 mL, Rfl: 11    FLUoxetine 40 MG capsule, Take 2 capsules (80 mg total) by mouth once daily., Disp: 60 capsule, Rfl: 5    losartan (COZAAR) 100 MG tablet, Take 100 mg by mouth once daily., Disp: , Rfl:     ondansetron (ZOFRAN-ODT) 4 MG TbDL, Take 1 tablet (4 mg total) by mouth every 6 (six) hours as needed (nausea)., Disp: 30 tablet, Rfl: 3    QUEtiapine (SEROQUEL) 50 MG tablet, Take 1 tablet (50 mg total) by mouth nightly as needed (insomnia)., Disp: 30 tablet, Rfl: 5    rizatriptan (MAXALT) 10 MG tablet, Take 1 tablet (10 mg total) by mouth every 2 (two) hours as needed for Migraine., Disp: 12 tablet, Rfl: 3    sumatriptan (IMITREX) 100 MG tablet, Take 1 tablet by mouth at onset of headache.  May repeat after 2 hours as needed.  No more than 2 tabs in 24  "hours, Disp: 18 tablet, Rfl: 11    ALLERGIES:  Review of patient's allergies indicates:   Allergen Reactions    Pertussis vaccines      Pt had "bad reaction" when she received a vaccination, per her mother       RELEVANT LABS/STUDIES:    No results found for: WBC, HGB, HCT, MCV, PLT  BMP  No results found for: NA, K, CL, CO2, BUN, CREATININE, CALCIUM, ANIONGAP, ESTGFRAFRICA, EGFRNONAA  No results found for: ALT, AST, GGT, ALKPHOS, BILITOT  No results found for: TSH  Lab Results   Component Value Date    HGBA1C 5.4 04/27/2021       VITALS  There were no vitals filed for this visit.    PHYSICAL EXAM  General: No acute distress, well developed/nourished  Neurologic:   Gait: Normal   Psychomotor signs:  No involuntary movements or tremor  AIMS: none    PSYCHIATRIC EXAM:     Mental Status Exam:  Appearance: good grooming, age appropriate  Behavior/Cooperation: normal, cooperative   Speech:  normal tone, normal rate, normal pitch, normal volume  Language: uses words appropriately; NO aphasia or dysarthria  Mood: Good  Affect:  Euthymic  Thought Process: Normal and logical  Thought Content: No SI, HI, AVH  Level of Consciousness: Alert and Oriented x3  Memory:  Recent and remote intact  Attention/concentration: appropriate for age/education.   Fund of Knowledge: appears adequate  Insight:  has awareness of illness  Judgment: good AEB seeking changes to regimen in face of worsening depression/anxiety      IMPRESSION:    Radha Fonseca is a 37 y.o. female with history of MDD, FLAKITA who presents for follow up appointment for medication management.  The  has been reviewed, no concerning signs were noted.       Status/Progress:  Based on the examination today, the patient's problem(s) is/are inadequately controlled.  New problems have not been presented today.     DIAGNOSES:    ICD-10-CM ICD-9-CM   1. Recurrent major depressive disorder, in partial remission  F33.41 296.35   2. FLAKITA (generalized anxiety disorder)  F41.1 300.02 "       PLAN:  Psych Med:  - Cross titrate from Prozac to Zoloft:  - Weeks 1-2: Decrease Prozac to 60 mg and start taking Zoloft 50 mg  - Weeks 3-4: Decrease Prozac to 40 mg and increase Zoloft to 100 mg  - Continue Wellbutrin to 450 mg XL total, 300 + 150 mg XL daily - to target energy/focus  - Continue Seroquel 50 mg PO qhs for insomnia and SSRI augmentation - pt typically takes 25 mg nightly (recent labs A1C, CBC/CMP, lipids 4/2021 WNL - may repeat in future)  - Continue Xanax 1 mg prn for anxiety, fills 30 supply intermittently, will provide 1 refill this visit RTC time 6 months    Discussed with patient informed consent, risks vs. benefits, alternative treatments, side effect profile and the inherent unpredictability of individual responses to these treatments. Answered any questions patient may have had. The patient expresses understanding of the above and displays the capacity to agree with this current plan     Other: None    RETURN TO CLINIC:  1 month        Qamar Degroot MD  [facility], PGY-III   8/26/2022 8:06 AM

## 2022-10-14 ENCOUNTER — OFFICE VISIT (OUTPATIENT)
Dept: PSYCHIATRY | Facility: CLINIC | Age: 37
End: 2022-10-14
Payer: COMMERCIAL

## 2022-10-14 DIAGNOSIS — F41.1 GAD (GENERALIZED ANXIETY DISORDER): ICD-10-CM

## 2022-10-14 DIAGNOSIS — F33.41 RECURRENT MAJOR DEPRESSIVE DISORDER, IN PARTIAL REMISSION: Primary | ICD-10-CM

## 2022-10-14 PROCEDURE — 4010F ACE/ARB THERAPY RXD/TAKEN: CPT | Mod: CPTII,95,, | Performed by: STUDENT IN AN ORGANIZED HEALTH CARE EDUCATION/TRAINING PROGRAM

## 2022-10-14 PROCEDURE — 99999 PR PBB SHADOW E&M-EST. PATIENT-LVL I: ICD-10-PCS | Mod: PBBFAC,,, | Performed by: STUDENT IN AN ORGANIZED HEALTH CARE EDUCATION/TRAINING PROGRAM

## 2022-10-14 PROCEDURE — 99213 PR OFFICE/OUTPT VISIT, EST, LEVL III, 20-29 MIN: ICD-10-PCS | Mod: 95,,, | Performed by: STUDENT IN AN ORGANIZED HEALTH CARE EDUCATION/TRAINING PROGRAM

## 2022-10-14 PROCEDURE — 99999 PR PBB SHADOW E&M-EST. PATIENT-LVL I: CPT | Mod: PBBFAC,,, | Performed by: STUDENT IN AN ORGANIZED HEALTH CARE EDUCATION/TRAINING PROGRAM

## 2022-10-14 PROCEDURE — 4010F PR ACE/ARB THEARPY RXD/TAKEN: ICD-10-PCS | Mod: CPTII,95,, | Performed by: STUDENT IN AN ORGANIZED HEALTH CARE EDUCATION/TRAINING PROGRAM

## 2022-10-14 PROCEDURE — 99213 OFFICE O/P EST LOW 20 MIN: CPT | Mod: 95,,, | Performed by: STUDENT IN AN ORGANIZED HEALTH CARE EDUCATION/TRAINING PROGRAM

## 2022-10-14 RX ORDER — ALPRAZOLAM 0.5 MG/1
0.5 TABLET ORAL DAILY PRN
Qty: 30 TABLET | Refills: 0 | Status: SHIPPED | OUTPATIENT
Start: 2022-10-14 | End: 2022-11-17 | Stop reason: SDUPTHER

## 2022-10-14 RX ORDER — SERTRALINE HYDROCHLORIDE 100 MG/1
100 TABLET, FILM COATED ORAL DAILY
Qty: 30 TABLET | Refills: 11 | Status: SHIPPED | OUTPATIENT
Start: 2022-10-14 | End: 2023-04-14 | Stop reason: SDUPTHER

## 2022-10-14 RX ORDER — ALPRAZOLAM 0.5 MG/1
0.5 TABLET ORAL 3 TIMES DAILY
Qty: 90 TABLET | Refills: 0 | Status: SHIPPED | OUTPATIENT
Start: 2022-10-14 | End: 2022-10-14

## 2022-10-14 NOTE — PROGRESS NOTES
OUTPATIENT PSYCHIATRY FOLLOW UP VISIT    ENCOUNTER DATE:  10/14/2022  SITE:  [facility], [address]  LENGTH OF SESSION:  30 minutes    TELE PSYCHIATRY Disclaimer   *The patient was informed despite using HIPPA compliant technology there may be risks including security breach, technological failure, inability to perform a comprehensive physical exam which could delay or prevent an accurate diagnosis, and potential complications from treatment decisions rendered over a telemedical platform. The patient understands and consented to the use of tele-health service as being a safe measure to mitigate during COVID 19 Pandemic .  The patient was also informed of the relationship between the physician and patient and the respective role of any other health care provider with respect to management of the patient; and notified that the pt may decline to receive medical services by telemedicine and may withdraw from such care at any time.     Patient's Current location: [address] [place] [address]  exact physical address for OUTPT visit  In Case of Emergency pts next of kin  Name:Woo Caceres  Phone number:  [phone]  Visit type: Virtual visit with synchronous audio and video  Total time spent with patient: 50     CHIEF COMPLAINT:  Follow up for medication management    HISTORY OF PRESENTING ILLNESS:  Radha Fonseca is a 37 y.o. female with history of MDD, FLAKITA who presents for follow up appointment.      Patient was last seen for appointment in this clinic  two months ago, during which cross-titration was initiated from Prozac to Zoloft .     Per last note, current psychotropic medications included:  - Prozac 40 mg PO daily  - Zoloft 100 mg PO daily  - Wellbutrin to 450 mg XL total, 300 + 150 mg XL daily - to target energy/focus  - Seroquel 50 mg PO qhs for insomnia and SSRI augmentation - pt typically takes 25 mg nightly (recent labs A1C, CBC/CMP, lipids 4/2021 WNL - may repeat in future)  -  "Xanax 1 mg prn for anxiety      Interval history as told by Patient   Today, patient reports some minimal improvement in her mood and anxiety, though she states that she has been busy travelling. In particular, she states she has been socializing more with friends and is less self-conscious in these social settings. Sleep is "hit or miss" but she again attributes this to recent travels. Energy remains poor. She has not noted any improvement in her sex drive since cross-titration. Denies any side effects from the new medication or withdrawal effects from the Prozac. She has not had any passive suicidal thoughts since the cross-titration began. Xanax use is reportedly decreased. States that she has only used a half tablet since the beginning of October. States she would like to be prescribed the 0.5 mg dose instead of 1 mg. Compliant with all medications. No other complaints at this time.    Patient Health Questionnaire-2 (PHQ-2)  Over the last 2 weeks, how often have you been bothered by the following problems?    Little interest or pleasure in doing things + 0 - Not at all   Feeling down, depressed, and hopeless +1 - Several days   Total score (>3 considered positive screen) 1   Follow up positive screen with PHQ-9      PSYCHIATRIC REVIEW OF SYSTEMS:    Symptoms of Depression: hopelessness, low energy    Symptoms of Anxiety/ panic attacks: yes    Symptoms of Brook/Hypomania: None    Symptoms of psychosis: None    Sleep: None    Alcohol: No excessive drinking reported    Illicit Drugs: No illicit substance use reported      Risk Parameters:  Patient denies no SI, HI or self-injurious behavior      PSYCHIATRIC MED REVIEW  Current medications:  Prozac 40 mg daily  Zoloft 100 mg daily  Wellbutrin 450 mg daily  Seroquel 50 mg nightly  Xanax 0.5 mg prn daily    Current psych meds  Medication side effects:  none  Medication compliance:  Full      PAST PSYCHIATRIC, MEDICAL, AND SOCIAL HISTORY  Medical Hx:  Migraines, " HTN  Meds: Losartan, Qulipta, Imitrex PRN    Psychiatric Hx:  Diagnosis: MDD, FLAKITA  Previous medication trials:  Antidepressants   - Trazodone: intolerable side effects   - Effexor, didn't remember if took   - Prozac, 10 years, previously on it in high school, decreased sex drive in last few years   - Wellbutrin, 2 years   - Seroquel, 5-6 years, initially reported drowsiness  Stimulants  Anxiolytics   - Xanax  Other   - Vistaril  Hospitalizations: No  Suicide attempt: No    Social Hx: Lives with , no kids, masters degree    Family Hx: mother: depression, PTSD      MEDICAL REVIEW OF SYSTEMS:  Complete review of systems performed covering Constitutional, Musculoskeletal, Neurologic.  All systems negative.    ALL MEDICATIONS:    Current Outpatient Medications:     ALPRAZolam (XANAX) 1 MG tablet, Take 1 tablet (1 mg total) by mouth daily as needed for Anxiety., Disp: 30 tablet, Rfl: 0    atogepant (QULIPTA) 60 mg Tab, Take 60 mg by mouth once daily., Disp: 30 tablet, Rfl: 5    buPROPion (WELLBUTRIN XL) 150 MG TB24 tablet, Take 1 tablet (150 mg total) by mouth once daily., Disp: 30 tablet, Rfl: 5    buPROPion (WELLBUTRIN XL) 300 MG 24 hr tablet, Take 1 tablet (300 mg total) by mouth once daily., Disp: 30 tablet, Rfl: 5    erenumab-aooe (AIMOVIG AUTOINJECTOR) 140 mg/mL autoinjector, Inject 1 mL (140 mg total) into the skin every 28 days., Disp: 1 mL, Rfl: 11    FLUoxetine 40 MG capsule, Take 1 capsule (40 mg total) by mouth once daily. Week 1-2: Take 20 mg + 40 mg capsule. Week 3-4: Take 40 mg capsule only, Disp: 30 capsule, Rfl: 0    losartan (COZAAR) 100 MG tablet, Take 100 mg by mouth once daily., Disp: , Rfl:     ondansetron (ZOFRAN-ODT) 4 MG TbDL, Take 1 tablet (4 mg total) by mouth every 6 (six) hours as needed (nausea)., Disp: 30 tablet, Rfl: 3    QUEtiapine (SEROQUEL) 50 MG tablet, Take 1 tablet (50 mg total) by mouth nightly as needed (insomnia)., Disp: 30 tablet, Rfl: 5    rizatriptan (MAXALT) 10 MG  "tablet, Take 1 tablet (10 mg total) by mouth every 2 (two) hours as needed for Migraine., Disp: 12 tablet, Rfl: 3    sertraline (ZOLOFT) 50 MG tablet, Take 2 tablets (100 mg total) by mouth once daily. Week 1-2: Take 50 mg (one capsule). Week 3-4: Take 100 mg (two capsules), Disp: 60 tablet, Rfl: 0    sumatriptan (IMITREX) 100 MG tablet, Take 1 tablet by mouth at onset of headache.  May repeat after 2 hours as needed.  No more than 2 tabs in 24 hours, Disp: 18 tablet, Rfl: 11    ALLERGIES:  Review of patient's allergies indicates:   Allergen Reactions    Pertussis vaccines      Pt had "bad reaction" when she received a vaccination, per her mother       RELEVANT LABS/STUDIES:    No results found for: WBC, HGB, HCT, MCV, PLT  BMP  No results found for: NA, K, CL, CO2, BUN, CREATININE, CALCIUM, ANIONGAP, ESTGFRAFRICA, EGFRNONAA  No results found for: ALT, AST, GGT, ALKPHOS, BILITOT  No results found for: TSH  Lab Results   Component Value Date    HGBA1C 5.4 04/27/2021       VITALS  There were no vitals filed for this visit.    PHYSICAL EXAM  General: No acute distress, well developed/nourished  Neurologic:   Gait: Normal   Psychomotor signs:  No involuntary movements or tremor  AIMS: none    PSYCHIATRIC EXAM:     Mental Status Exam:  Appearance: good grooming, age appropriate  Behavior/Cooperation: normal, cooperative   Speech:  normal tone, normal rate, normal pitch, normal volume  Language: uses words appropriately; NO aphasia or dysarthria  Mood: Good  Affect:  Euthymic  Thought Process: Normal and logical  Thought Content: No SI, HI, AVH  Level of Consciousness: Alert and Oriented x3  Memory:  Recent and remote intact  Attention/concentration: appropriate for age/education.   Fund of Knowledge: appears adequate  Insight:  has awareness of illness  Judgment: good AEB seeking changes to regimen in face of worsening depression/anxiety      IMPRESSION:    Radha Fonseca is a 37 y.o. female with history of MDD, FLAKITA who " presents for follow up appointment for medication management.  The  has been reviewed, no concerning signs were noted.       Status/Progress:  Based on the examination today, the patient's problem(s) is/are inadequately controlled.  New problems have not been presented today.     DIAGNOSES:    ICD-10-CM ICD-9-CM   1. Recurrent major depressive disorder, in partial remission  F33.41 296.35   2. FLAKITA (generalized anxiety disorder)  F41.1 300.02         PLAN:  Psych Med:  - Stop Prozac  - Increase Zoloft to 200 mg PO daily  - Continue Wellbutrin to 450 mg XL total, 300 + 150 mg XL daily - to target energy/focus  - Continue Seroquel 50 mg PO qhs for insomnia and SSRI augmentation - pt typically takes 25 mg nightly (recent labs A1C, CBC/CMP, lipids 4/2021 WNL - may repeat in future)  - Decrease Xanax to 0.5 mg prn for anxiety, given one refill  - PDMP reviewed: patient filling all medications on time, no abnormalities noted     Discussed with patient informed consent, risks vs. benefits, alternative treatments, side effect profile and the inherent unpredictability of individual responses to these treatments. Answered any questions patient may have had. The patient expresses understanding of the above and displays the capacity to agree with this current plan     Other: None    RETURN TO CLINIC:  1 month        Qamar Degroot MD  [facility], PGY-III   10/14/2022 8:06 AM

## 2022-11-02 ENCOUNTER — OFFICE VISIT (OUTPATIENT)
Dept: NEUROLOGY | Facility: CLINIC | Age: 37
End: 2022-11-02
Payer: COMMERCIAL

## 2022-11-02 ENCOUNTER — PATIENT MESSAGE (OUTPATIENT)
Dept: PSYCHIATRY | Facility: CLINIC | Age: 37
End: 2022-11-02
Payer: COMMERCIAL

## 2022-11-02 DIAGNOSIS — G43.719 INTRACTABLE CHRONIC MIGRAINE WITHOUT AURA AND WITHOUT STATUS MIGRAINOSUS: Primary | ICD-10-CM

## 2022-11-02 PROCEDURE — 4010F PR ACE/ARB THEARPY RXD/TAKEN: ICD-10-PCS | Mod: CPTII,S$GLB,, | Performed by: NURSE PRACTITIONER

## 2022-11-02 PROCEDURE — 99499 UNLISTED E&M SERVICE: CPT | Mod: S$GLB,,, | Performed by: NURSE PRACTITIONER

## 2022-11-02 PROCEDURE — 4010F ACE/ARB THERAPY RXD/TAKEN: CPT | Mod: CPTII,S$GLB,, | Performed by: NURSE PRACTITIONER

## 2022-11-02 PROCEDURE — 99499 NO LOS: ICD-10-PCS | Mod: S$GLB,,, | Performed by: NURSE PRACTITIONER

## 2022-11-02 PROCEDURE — 99999 PR PBB SHADOW E&M-EST. PATIENT-LVL I: ICD-10-PCS | Mod: PBBFAC,,, | Performed by: NURSE PRACTITIONER

## 2022-11-02 PROCEDURE — 64615 PR CHEMODENERVATION OF MUSCLE FOR CHRONIC MIGRAINE: ICD-10-PCS | Mod: S$GLB,,, | Performed by: NURSE PRACTITIONER

## 2022-11-02 PROCEDURE — 99999 PR PBB SHADOW E&M-EST. PATIENT-LVL I: CPT | Mod: PBBFAC,,, | Performed by: NURSE PRACTITIONER

## 2022-11-02 PROCEDURE — 64615 CHEMODENERV MUSC MIGRAINE: CPT | Mod: S$GLB,,, | Performed by: NURSE PRACTITIONER

## 2022-11-02 NOTE — PROGRESS NOTES
PROCEDURE NOTE:     After informed consent signed and dated, and risks and benefits of procedure was discussed, BOTOX injection was performed     Frontalis- 15 units on each side   - 5 units on each side   Procerus- 10 units   Occipitalis- 20 units on each side   Temporalis - 25 units on each side   Trapezius- 15 units on each side   Cervical paraspinal muscles- 15 units on each side     Patient tolerated procedure well w/o any complications.     Having less migraines/month on botox and qulipta (former aimovig). Return TTH close to repeat injection Overall continues to benefit from botox and cgrp inhibitor/less frequent than prior, benefiting    Continue prn imitrex +- retrial elyxb or excedrin migraine or maxalt prn  continue Qulipta 60mg qd  rtc 3mos

## 2022-11-12 ENCOUNTER — PATIENT MESSAGE (OUTPATIENT)
Dept: PSYCHIATRY | Facility: CLINIC | Age: 37
End: 2022-11-12
Payer: COMMERCIAL

## 2022-11-12 DIAGNOSIS — F41.1 GAD (GENERALIZED ANXIETY DISORDER): ICD-10-CM

## 2022-11-12 DIAGNOSIS — F33.41 RECURRENT MAJOR DEPRESSIVE DISORDER, IN PARTIAL REMISSION: ICD-10-CM

## 2022-11-17 RX ORDER — BUPROPION HYDROCHLORIDE 150 MG/1
150 TABLET ORAL DAILY
Qty: 30 TABLET | Refills: 5 | Status: SHIPPED | OUTPATIENT
Start: 2022-11-17 | End: 2023-04-14 | Stop reason: SDUPTHER

## 2022-11-17 RX ORDER — BUPROPION HYDROCHLORIDE 300 MG/1
300 TABLET ORAL DAILY
Qty: 30 TABLET | Refills: 5 | Status: SHIPPED | OUTPATIENT
Start: 2022-11-17 | End: 2023-04-14 | Stop reason: SDUPTHER

## 2022-11-17 RX ORDER — ALPRAZOLAM 0.5 MG/1
0.5 TABLET ORAL DAILY PRN
Qty: 30 TABLET | Refills: 0 | Status: SHIPPED | OUTPATIENT
Start: 2022-11-17 | End: 2023-02-10

## 2023-01-31 ENCOUNTER — PROCEDURE VISIT (OUTPATIENT)
Dept: NEUROLOGY | Facility: CLINIC | Age: 38
End: 2023-01-31
Payer: COMMERCIAL

## 2023-01-31 DIAGNOSIS — G43.719 INTRACTABLE CHRONIC MIGRAINE WITHOUT AURA AND WITHOUT STATUS MIGRAINOSUS: Primary | ICD-10-CM

## 2023-01-31 PROCEDURE — 64615 CHEMODENERV MUSC MIGRAINE: CPT | Mod: S$GLB,,, | Performed by: NURSE PRACTITIONER

## 2023-01-31 PROCEDURE — 64615 PR CHEMODENERVATION OF MUSCLE FOR CHRONIC MIGRAINE: ICD-10-PCS | Mod: S$GLB,,, | Performed by: NURSE PRACTITIONER

## 2023-01-31 RX ORDER — SUMATRIPTAN SUCCINATE 100 MG/1
TABLET ORAL
Qty: 18 TABLET | Refills: 11 | Status: SHIPPED | OUTPATIENT
Start: 2023-01-31 | End: 2024-02-06 | Stop reason: SDUPTHER

## 2023-02-01 NOTE — PROCEDURES
Procedures  BOTOX injection was performed     Frontalis- 15 units on each side   - 5 units on each side   Procerus- 10 units   Occipitalis- 20 units on each side   Temporalis - 25 units on each side   Trapezius- 15 units on each side   Cervical paraspinal muscles- 15 units on each side      Remains on qulipta 60mg qd  Doing well. No significant worsening this time close to repeat botox  Refills of imitrex  Maybe 1-2 severe episodes since seen.

## 2023-02-10 ENCOUNTER — OFFICE VISIT (OUTPATIENT)
Dept: PSYCHIATRY | Facility: CLINIC | Age: 38
End: 2023-02-10
Payer: COMMERCIAL

## 2023-02-10 ENCOUNTER — PATIENT MESSAGE (OUTPATIENT)
Dept: PSYCHIATRY | Facility: CLINIC | Age: 38
End: 2023-02-10

## 2023-02-10 DIAGNOSIS — F33.42 RECURRENT MAJOR DEPRESSIVE DISORDER, IN FULL REMISSION: ICD-10-CM

## 2023-02-10 DIAGNOSIS — F41.1 GAD (GENERALIZED ANXIETY DISORDER): Primary | ICD-10-CM

## 2023-02-10 PROCEDURE — 99213 OFFICE O/P EST LOW 20 MIN: CPT | Mod: 95,,, | Performed by: STUDENT IN AN ORGANIZED HEALTH CARE EDUCATION/TRAINING PROGRAM

## 2023-02-10 PROCEDURE — 4010F ACE/ARB THERAPY RXD/TAKEN: CPT | Mod: CPTII,95,, | Performed by: STUDENT IN AN ORGANIZED HEALTH CARE EDUCATION/TRAINING PROGRAM

## 2023-02-10 PROCEDURE — 4010F PR ACE/ARB THEARPY RXD/TAKEN: ICD-10-PCS | Mod: CPTII,95,, | Performed by: STUDENT IN AN ORGANIZED HEALTH CARE EDUCATION/TRAINING PROGRAM

## 2023-02-10 PROCEDURE — 99213 PR OFFICE/OUTPT VISIT, EST, LEVL III, 20-29 MIN: ICD-10-PCS | Mod: 95,,, | Performed by: STUDENT IN AN ORGANIZED HEALTH CARE EDUCATION/TRAINING PROGRAM

## 2023-02-10 RX ORDER — ALPRAZOLAM 1 MG/1
1 TABLET ORAL DAILY PRN
Qty: 60 TABLET | Refills: 1 | Status: SHIPPED | OUTPATIENT
Start: 2023-02-10

## 2023-02-10 NOTE — PROGRESS NOTES
OUTPATIENT PSYCHIATRY FOLLOW UP VISIT    ENCOUNTER DATE:  2/10/2023  SITE:  [facility], [address]  LENGTH OF SESSION:  30 minutes    TELE PSYCHIATRY Disclaimer   *The patient was informed despite using HIPPA compliant technology there may be risks including security breach, technological failure, inability to perform a comprehensive physical exam which could delay or prevent an accurate diagnosis, and potential complications from treatment decisions rendered over a telemedical platform. The patient understands and consented to the use of tele-health service as being a safe measure to mitigate during COVID 19 Pandemic .  The patient was also informed of the relationship between the physician and patient and the respective role of any other health care provider with respect to management of the patient; and notified that the pt may decline to receive medical services by telemedicine and may withdraw from such care at any time.     Patient's Current location: [address] [place] [address]  exact physical address for OUTPT visit  In Case of Emergency pts next of kin  Name:Woo Caceres  Phone number:  [phone]  Visit type: Virtual visit with synchronous audio and video  Total time spent with patient: 50     CHIEF COMPLAINT:  Follow up for medication management    HISTORY OF PRESENTING ILLNESS:  Radha Fonseca is a 37 y.o. female with history of MDD, FLAKITA who presents for follow up appointment.      Patient was last seen for appointment in this clinic  two months ago, during which  Zoloft was increased and Prozac was discontinued.     Per last note, current psychotropic medications included:  - Zoloft 200 mg PO daily  - Wellbutrin to 450 mg XL total, 300 + 150 mg XL daily - to target energy/focus  - Seroquel 50 mg PO qhs for insomnia and SSRI augmentation - pt typically takes 25 mg nightly (recent labs A1C, CBC/CMP, lipids 4/2021 WNL - may repeat in future)  - Xanax 1 mg prn for  anxiety      Interval history as told by Patient   Today, patient reports significant improvement in her mood and anxiety. In particular, she states she has been socializing more with friends and is less self-conscious in these social settings. She has stopped taking Seroquel, as she did not refill it and realized she no longer needed it for sleep. Pt also reports that she is still taking the 100 mg dose of Zoloft. Energy is improved and pt has been motivating herself to exercise more to improve this. She reports that she has noticed improvement in her sexual function since discontinuing the Prozac. Denies any side effects from the new medication or withdrawal effects from the Prozac. She has not had any passive suicidal thoughts. Xanax use is about 2-3 times per week. She states she had been taking two pills (1 mg) as the lower dose was unhelpful. No other complaints at this time.    PSYCHIATRIC REVIEW OF SYSTEMS:    Symptoms of Depression: low energy (improving)    Symptoms of Anxiety/ panic attacks: yes    Symptoms of Brook/Hypomania: None    Symptoms of psychosis: None    Sleep: None    Alcohol: No excessive drinking reported    Illicit Drugs: No illicit substance use reported      Risk Parameters:  Patient denies no SI, HI or self-injurious behavior      PSYCHIATRIC MED REVIEW  Current medications:  Zoloft 100 mg daily  Wellbutrin 450 mg daily  Xanax 0.5 mg prn daily    Current psych meds  Medication side effects:  none  Medication compliance:  Full      PAST PSYCHIATRIC, MEDICAL, AND SOCIAL HISTORY  Medical Hx:  Migraines, HTN  Meds: Losartan, Qulipta, Imitrex PRN    Psychiatric Hx:  Diagnosis: MDD, FLAKITA  Previous medication trials:  Antidepressants   - Trazodone: intolerable side effects   - Effexor, didn't remember if took   - Prozac, 10 years, previously on it in high school, decreased sex drive in last few years   - Wellbutrin, 2 years   - Seroquel, 5-6 years, initially reported  drowsiness  Stimulants  Anxiolytics   - Xanax  Other   - Vistaril  Hospitalizations: No  Suicide attempt: No    Social Hx: Lives with , no kids, masters degree    Family Hx: mother: depression, PTSD      MEDICAL REVIEW OF SYSTEMS:  Complete review of systems performed covering Constitutional, Musculoskeletal, Neurologic.  All systems negative.    ALL MEDICATIONS:    Current Outpatient Medications:     ALPRAZolam (XANAX) 0.5 MG tablet, Take 1 tablet (0.5 mg total) by mouth daily as needed for Anxiety., Disp: 30 tablet, Rfl: 0    atogepant (QULIPTA) 60 mg Tab, TAKE ONE TABLET BY MOUTH ONCE DAILY, Disp: 90 tablet, Rfl: 3    buPROPion (WELLBUTRIN XL) 150 MG TB24 tablet, Take 1 tablet (150 mg total) by mouth once daily., Disp: 30 tablet, Rfl: 5    buPROPion (WELLBUTRIN XL) 300 MG 24 hr tablet, Take 1 tablet (300 mg total) by mouth once daily., Disp: 30 tablet, Rfl: 5    FLUoxetine 40 MG capsule, Take 1 capsule (40 mg total) by mouth once daily. Week 1-2: Take 20 mg + 40 mg capsule. Week 3-4: Take 40 mg capsule only, Disp: 30 capsule, Rfl: 0    losartan (COZAAR) 100 MG tablet, Take 100 mg by mouth once daily., Disp: , Rfl:     ondansetron (ZOFRAN-ODT) 4 MG TbDL, Take 1 tablet (4 mg total) by mouth every 6 (six) hours as needed (nausea)., Disp: 30 tablet, Rfl: 3    QUEtiapine (SEROQUEL) 50 MG tablet, Take 1 tablet (50 mg total) by mouth nightly as needed (insomnia)., Disp: 30 tablet, Rfl: 5    rizatriptan (MAXALT) 10 MG tablet, Take 1 tablet (10 mg total) by mouth every 2 (two) hours as needed for Migraine., Disp: 12 tablet, Rfl: 3    sertraline (ZOLOFT) 100 MG tablet, Take 1 tablet (100 mg total) by mouth once daily., Disp: 30 tablet, Rfl: 11    sumatriptan (IMITREX) 100 MG tablet, Take 1 tablet by mouth at onset of headache.  May repeat after 2 hours as needed.  No more than 2 tabs in 24 hours, Disp: 18 tablet, Rfl: 11    ALLERGIES:  Review of patient's allergies indicates:   Allergen Reactions    Pertussis  "vaccines      Pt had "bad reaction" when she received a vaccination, per her mother       RELEVANT LABS/STUDIES:    No results found for: WBC, HGB, HCT, MCV, PLT  BMP  No results found for: NA, K, CL, CO2, BUN, CREATININE, CALCIUM, ANIONGAP, ESTGFRAFRICA, EGFRNONAA  No results found for: ALT, AST, GGT, ALKPHOS, BILITOT  No results found for: TSH  Lab Results   Component Value Date    HGBA1C 5.4 04/27/2021       VITALS  There were no vitals filed for this visit.    PHYSICAL EXAM  General: No acute distress, well developed/nourished  Neurologic:   Gait: Normal   Psychomotor signs:  No involuntary movements or tremor  AIMS: none    PSYCHIATRIC EXAM:     Mental Status Exam:  Appearance: good grooming, age appropriate  Behavior/Cooperation: normal, cooperative   Speech:  normal tone, normal rate, normal pitch, normal volume  Language: uses words appropriately; NO aphasia or dysarthria  Mood: Good  Affect:  Euthymic  Thought Process: Normal and logical  Thought Content: No SI, HI, AVH  Level of Consciousness: Alert and Oriented x3  Memory:  Recent and remote intact  Attention/concentration: appropriate for age/education.   Fund of Knowledge: appears adequate  Insight:  has awareness of illness  Judgment: good AEB seeking changes to regimen in face of worsening depression/anxiety      IMPRESSION:    Radha Fonseca is a 37 y.o. female with history of MDD, FLAKITA who presents for follow up appointment for medication management.  The  has been reviewed, no concerning signs were noted.       Status/Progress:  Based on the examination today, the patient's problem(s) is/are resolved.  New problems have not been presented today.     DIAGNOSES:    ICD-10-CM ICD-9-CM   1. FLAKITA (generalized anxiety disorder)  F41.1 300.02   2. Recurrent major depressive disorder, in full remission  F33.42 296.36       PLAN:  Psych Med:  - Continue Zoloft 100 mg PO daily for depression and anxiety  - Continue Wellbutrin to 450 mg XL total, 300 + 150 " mg XL daily - to target energy/focus and depression  - Stopped Seroquel  - Increase Xanax to 1 mg prn for anxiety   - PDMP reviewed: patient filling all medications on time, no abnormalities noted    - Last refill picked up on 11/22/22   - Provided with one refills, starting on 2/10/23      Discussed with patient informed consent, risks vs. benefits, alternative treatments, side effect profile and the inherent unpredictability of individual responses to these treatments. Answered any questions patient may have had. The patient expresses understanding of the above and displays the capacity to agree with this current plan     Other: None    RETURN TO CLINIC:  2-3 months    Case to be discussed with supervising staff, MD Qamar Pace MD  [facility], PGY-III   2/10/2023 8:06 AM

## 2023-02-21 NOTE — PROGRESS NOTES
"  12/24/2018  11:12 AM  Radha Fonseca  [id number]    Outpatient Psychiatry Follow-Up Visit (MD/NP)    12/24/2018    Clinical Status of Patient:  Outpatient (Ambulatory)    Chief Complaint:  Radha Fonseca is a 33 y.o. female who presents today for follow-up of depression, anxiety and insomnia.  Met with patient.      Interval History and Content of Current Session:  Interim Events/Subjective Report/Content of Current Session:    Patient presents for follow up from previous provider Dr. Adams. 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 She states that her anxiety levels are increased lately. She states she is taking more Xanax than before, usually daily now, took 4 times this weekend. She states she "scatches" her arms when she starts to "hyperventilate." She states she is also drinking more alcohol. She states she drank "two large glasses" of beer Saturday, drinking about 1-2x per week when going out with friends (usually a few beers), and may have a glass of wine a few nights per week. She denies a history of heavy alcohol use (more than 5 drinks per day), no history of rehab. She states she has started to taking boxing classes which she enjoys and finding helpful, "feels like a weight is lifted the next day."    She states that she has been feeling sad frequently since March of this year after leaving her previously job (" of [employer]"). She states that she feels "lonely" (patient tearful at this point), "I feel very bogged down." She states feels "angry and frustrated," during the week. She states that she had thoughts about death a few weeks ago, denies making any plans for suicide, no suicidal intent. She states she has had "no sex drive" lately, denies any reckless, dangerous behaviors. She states she is sleeping about 8-10 hours per night. She states her energy levels are "sluggish" some " "days. She states she is an "emotional eater," and goes out to eat frequently.      Review of Systems   Review of Systems   Constitutional: Negative for chills and fever.   HENT: Negative for hearing loss.    Eyes: Negative for blurred vision and double vision.   Respiratory: Negative for shortness of breath.    Cardiovascular: Negative for chest pain and palpitations.   Gastrointestinal: Positive for heartburn and vomiting. Negative for constipation, diarrhea and nausea.   Skin: Negative for rash.   Neurological: Negative for seizures and loss of consciousness.   Psychiatric/Behavioral: Negative for hallucinations and substance abuse.       Past Medical, Family and Social History: The patient's past medical, family and social history have been reviewed and updated as appropriate within the electronic medical record - see encounter notes.    Social History     Socioeconomic History    Marital status:      Spouse name: Woo    Number of children: 0    Years of education: Not on file    Highest education level: Not on file   Social Needs    Financial resource strain: Not on file    Food insecurity - worry: Not on file    Food insecurity - inability: Not on file    Transportation needs - medical: Not on file    Transportation needs - non-medical: Not on file   Occupational History     Employer: [employer]     Comment: school representative/graduate studies   Tobacco Use    Smoking status: Never Smoker   Substance and Sexual Activity    Alcohol use: Yes     Alcohol/week: 1.5 oz     Types: 3 drink(s) per week     Comment: max 2 drinks per day, 1-2 x per week    Drug use: No     Comment: prev use of THC, ecstasy - none in several yrs    Sexual activity: Yes     Partners: Male     Birth control/protection: IUD     Comment: no plans to get pregnant in the near future   Other Topics Concern    Patient feels they ought to cut down on drinking/drug use No    Patient annoyed by others criticizing " "their drinking/drug use No    Patient has felt bad or guilty about drinking/drug use No    Patient has had a drink/used drugs as an eye opener in the AM No   Social History Narrative    Pt born in [place], moved around a lot w  family, 1 older sister.  Parents  when pt was approx 10 yo - this was traumatic bc mom would tell pt too many details about her father cheating.  Pt not close w father any more, he is likely an alcoholic w strong alcoholism on his side of the family.  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   x 1.5 yrs to Woo - he is also starting as a pt here today.  No children, +cats.  No Religious, no  or legal hx.  Recreational activities include writing, reading, film, arts, travel.     Compliance: yes    Side effects: None    Risk Parameters:  Patient reports no suicidal ideation  Patient reports no homicidal ideation  Patient reports no self-injurious behavior  Patient reports no violent behavior    Exam (detailed: at least 9 elements; comprehensive: all 15 elements)     Vitals:    Vitals:    12/24/18 1029   BP: 132/88   Pulse: 102   Weight: 111.9 kg (246 lb 11.1 oz)   Height: 5' 7" (1.702 m)          CONSTITUTIONAL  General Appearance: in casual dress, NAD, calm, cooperative, appears stated age    MUSCULOSKELETAL  Abnormal Involuntary Movements: no dyskinesia, dystonia  Gait and Station: ambulating without difficutly    PSYCHIATRIC   Level of Consciousness: alert  Orientation: oriented to person, place, situation  Grooming: fair  Psychomotor Behavior: no agitation, retardation  Speech: normal rate, volume, tone  Language: English, no asphasia  Mood: "depressed"  Affect: constricted, mood congruent  Thought Process: linear, logical  Associations: cohesive  Thought Content: denies SI, HI  Memory: intact to recent and remote events  Attention: not distracted  Fund of Knowledge: appropriate for " education level  Insight: fair  Judgment: fair      Assessment and Diagnosis   Status/Progress: Based on the examination today, the patient's problem(s) is/are inadequately controlled.  New problems have not been presented today.   Co-morbidities are complicating management of the primary condition.    General Impression:    1) MDD, recurrent, moderate  2) FLAKITA  3) Insomnia        Intervention/Counseling/Treatment Plan     - increase dose of Prozac to 80 mg PO qd for depression  - continue Wellbutrin 37.5 mg PO BID as previously prescribed by Dr. Adams, may need to titrate further  - continue Xanax 1 mg PO qd PRN for anxiety; counseled patient on not taking xanax if drinking alcohol, risk of respiratory depression and death, not driving due to sedation, etc; patient verbalized agreement and understanding  - continue Seroquel 50 mg PO qhs for insomnia      - referred patient to CBT psychotherapy, patient states she will schedule an appointment      - Instructed patient to keep all scheduled appointments, take medications as prescribed and abstain from substance abuse. Instructed to call 911 or present to ER for emergency including SI or HI.    - Discussed diagnosis, risks and benefits of proposed treatment above vs alternative treatments vs no treatment, and potential side effects of these treatments. The patient expresses understanding of the above and displays the capacity to agree with this treatment given said understanding. Patient also agrees that, currently, the benefits outweigh the risks and would like to pursue treatment at this time.       Quintin Orlando MD PGY-3    Case discussed with attending, Dr. Lynn, who agrees with A/P as above    Return to Clinic: 6 weeks         4 = No assist / stand by assistance

## 2023-04-12 ENCOUNTER — PATIENT MESSAGE (OUTPATIENT)
Dept: PSYCHIATRY | Facility: CLINIC | Age: 38
End: 2023-04-12
Payer: COMMERCIAL

## 2023-04-12 DIAGNOSIS — F33.41 RECURRENT MAJOR DEPRESSIVE DISORDER, IN PARTIAL REMISSION: ICD-10-CM

## 2023-04-12 DIAGNOSIS — F41.1 GAD (GENERALIZED ANXIETY DISORDER): ICD-10-CM

## 2023-04-14 RX ORDER — BUPROPION HYDROCHLORIDE 150 MG/1
150 TABLET ORAL DAILY
Qty: 30 TABLET | Refills: 5 | Status: SHIPPED | OUTPATIENT
Start: 2023-04-14 | End: 2023-10-11

## 2023-04-14 RX ORDER — SERTRALINE HYDROCHLORIDE 100 MG/1
100 TABLET, FILM COATED ORAL DAILY
Qty: 30 TABLET | Refills: 5 | Status: SHIPPED | OUTPATIENT
Start: 2023-04-14 | End: 2023-10-11

## 2023-04-14 RX ORDER — BUPROPION HYDROCHLORIDE 300 MG/1
300 TABLET ORAL DAILY
Qty: 30 TABLET | Refills: 5 | Status: SHIPPED | OUTPATIENT
Start: 2023-04-14 | End: 2023-10-11

## 2023-05-03 ENCOUNTER — OFFICE VISIT (OUTPATIENT)
Dept: NEUROLOGY | Facility: CLINIC | Age: 38
End: 2023-05-03
Payer: COMMERCIAL

## 2023-05-03 DIAGNOSIS — G43.719 INTRACTABLE CHRONIC MIGRAINE WITHOUT AURA AND WITHOUT STATUS MIGRAINOSUS: Primary | ICD-10-CM

## 2023-05-03 PROCEDURE — 64615 CHEMODENERV MUSC MIGRAINE: CPT | Mod: S$GLB,,, | Performed by: NURSE PRACTITIONER

## 2023-05-03 PROCEDURE — 99999 PR PBB SHADOW E&M-EST. PATIENT-LVL I: ICD-10-PCS | Mod: PBBFAC,,, | Performed by: NURSE PRACTITIONER

## 2023-05-03 PROCEDURE — 99499 NO LOS: ICD-10-PCS | Mod: S$GLB,,, | Performed by: NURSE PRACTITIONER

## 2023-05-03 PROCEDURE — 99999 PR PBB SHADOW E&M-EST. PATIENT-LVL I: CPT | Mod: PBBFAC,,, | Performed by: NURSE PRACTITIONER

## 2023-05-03 PROCEDURE — 4010F ACE/ARB THERAPY RXD/TAKEN: CPT | Mod: CPTII,S$GLB,, | Performed by: NURSE PRACTITIONER

## 2023-05-03 PROCEDURE — 4010F PR ACE/ARB THEARPY RXD/TAKEN: ICD-10-PCS | Mod: CPTII,S$GLB,, | Performed by: NURSE PRACTITIONER

## 2023-05-03 PROCEDURE — 99499 UNLISTED E&M SERVICE: CPT | Mod: S$GLB,,, | Performed by: NURSE PRACTITIONER

## 2023-05-03 PROCEDURE — 64615 PR CHEMODENERVATION OF MUSCLE FOR CHRONIC MIGRAINE: ICD-10-PCS | Mod: S$GLB,,, | Performed by: NURSE PRACTITIONER

## 2023-06-13 ENCOUNTER — OFFICE VISIT (OUTPATIENT)
Dept: OPTOMETRY | Facility: CLINIC | Age: 38
End: 2023-06-13
Payer: COMMERCIAL

## 2023-06-13 DIAGNOSIS — H52.203 MYOPIA WITH ASTIGMATISM AND PRESBYOPIA, BILATERAL: ICD-10-CM

## 2023-06-13 DIAGNOSIS — H52.13 MYOPIA WITH ASTIGMATISM AND PRESBYOPIA, BILATERAL: ICD-10-CM

## 2023-06-13 DIAGNOSIS — H52.4 MYOPIA WITH ASTIGMATISM AND PRESBYOPIA, BILATERAL: ICD-10-CM

## 2023-06-13 DIAGNOSIS — G43.009 MIGRAINE WITHOUT AURA AND WITHOUT STATUS MIGRAINOSUS, NOT INTRACTABLE: Primary | ICD-10-CM

## 2023-06-13 PROCEDURE — 92014 PR EYE EXAM, EST PATIENT,COMPREHESV: ICD-10-PCS | Mod: S$GLB,,, | Performed by: OPTOMETRIST

## 2023-06-13 PROCEDURE — 99999 PR PBB SHADOW E&M-EST. PATIENT-LVL II: CPT | Mod: PBBFAC,,, | Performed by: OPTOMETRIST

## 2023-06-13 PROCEDURE — 1160F RVW MEDS BY RX/DR IN RCRD: CPT | Mod: CPTII,S$GLB,, | Performed by: OPTOMETRIST

## 2023-06-13 PROCEDURE — 99999 PR PBB SHADOW E&M-EST. PATIENT-LVL II: ICD-10-PCS | Mod: PBBFAC,,, | Performed by: OPTOMETRIST

## 2023-06-13 PROCEDURE — 1159F MED LIST DOCD IN RCRD: CPT | Mod: CPTII,S$GLB,, | Performed by: OPTOMETRIST

## 2023-06-13 PROCEDURE — 92015 DETERMINE REFRACTIVE STATE: CPT | Mod: S$GLB,,, | Performed by: OPTOMETRIST

## 2023-06-13 PROCEDURE — 92015 PR REFRACTION: ICD-10-PCS | Mod: S$GLB,,, | Performed by: OPTOMETRIST

## 2023-06-13 PROCEDURE — 1160F PR REVIEW ALL MEDS BY PRESCRIBER/CLIN PHARMACIST DOCUMENTED: ICD-10-PCS | Mod: CPTII,S$GLB,, | Performed by: OPTOMETRIST

## 2023-06-13 PROCEDURE — 92014 COMPRE OPH EXAM EST PT 1/>: CPT | Mod: S$GLB,,, | Performed by: OPTOMETRIST

## 2023-06-13 PROCEDURE — 4010F PR ACE/ARB THEARPY RXD/TAKEN: ICD-10-PCS | Mod: CPTII,S$GLB,, | Performed by: OPTOMETRIST

## 2023-06-13 PROCEDURE — 1159F PR MEDICATION LIST DOCUMENTED IN MEDICAL RECORD: ICD-10-PCS | Mod: CPTII,S$GLB,, | Performed by: OPTOMETRIST

## 2023-06-13 PROCEDURE — 4010F ACE/ARB THERAPY RXD/TAKEN: CPT | Mod: CPTII,S$GLB,, | Performed by: OPTOMETRIST

## 2023-06-13 NOTE — PROGRESS NOTES
ALMA DELIA    DANIEL: 01/22  Chief complaint (CC): Patient is here for annual eye exam today.  Patient   hasn't noticed any vision changes since the last exam.  Glasses still seem   fine.  Glasses? + 2 yrs. old  Contacts? -  H/o eye surgery, injections or laser: -  H/o eye injury: -  Known eye conditions? -  Family h/o eye conditions? MGF AMD and Mother with AMD?  Eye gtts? Visine prn      (-) Flashes (-)  Floaters (-) Mucous   (-)  Tearing (-) Itching (-) Burning   (-) Headaches (-) Eye Pain/discomfort (-) Irritation   (-)  Redness (-) Double vision (-) Blurry vision    Diabetic? -  A1c? -      Last edited by Carlita Mcbride on 6/13/2023  3:43 PM.            Assessment /Plan     For exam results, see Encounter Report.    Migraine without aura and without status migrainosus, not intractable    Myopia with astigmatism and presbyopia, bilateral    Cont botox injections for migraines.  SRx released to patient. Patient educated on lens options. Normal ocular health. RTC 1 year for routine exam.

## 2023-08-02 ENCOUNTER — OFFICE VISIT (OUTPATIENT)
Dept: SLEEP MEDICINE | Facility: CLINIC | Age: 38
End: 2023-08-02
Payer: COMMERCIAL

## 2023-08-02 DIAGNOSIS — G47.33 OSA (OBSTRUCTIVE SLEEP APNEA): Primary | ICD-10-CM

## 2023-08-02 DIAGNOSIS — G43.009 MIGRAINE WITHOUT AURA AND WITHOUT STATUS MIGRAINOSUS, NOT INTRACTABLE: ICD-10-CM

## 2023-08-02 PROCEDURE — 99499 UNLISTED E&M SERVICE: CPT | Mod: S$GLB,,, | Performed by: NURSE PRACTITIONER

## 2023-08-02 PROCEDURE — 99999 PR PBB SHADOW E&M-EST. PATIENT-LVL I: ICD-10-PCS | Mod: PBBFAC,,, | Performed by: NURSE PRACTITIONER

## 2023-08-02 PROCEDURE — 99999 PR PBB SHADOW E&M-EST. PATIENT-LVL I: CPT | Mod: PBBFAC,,, | Performed by: NURSE PRACTITIONER

## 2023-08-02 PROCEDURE — 99499 NO LOS: ICD-10-PCS | Mod: S$GLB,,, | Performed by: NURSE PRACTITIONER

## 2023-08-02 PROCEDURE — 64615 CHEMODENERV MUSC MIGRAINE: CPT | Mod: S$GLB,,, | Performed by: NURSE PRACTITIONER

## 2023-08-02 PROCEDURE — 64615 PR CHEMODENERVATION OF MUSCLE FOR CHRONIC MIGRAINE: ICD-10-PCS | Mod: S$GLB,,, | Performed by: NURSE PRACTITIONER

## 2023-08-02 NOTE — PROGRESS NOTES
Last botox 5/3/23  Procedures  BOTOX 155 adm/injection was performed     Frontalis- 15 units on each side   - 5 units on each side   Procerus- 10 units   Occipitalis- 15 units on each side   Temporalis - 20 units on each side   Trapezius- 15 units on each side   Cervical paraspinal muscles- 5 units on each side     Remains on qulipta 60mg qd  Doing well. No significant worsening this time close to repeat botox  Continue prn Imitrex

## 2023-10-25 ENCOUNTER — OFFICE VISIT (OUTPATIENT)
Dept: NEUROLOGY | Facility: CLINIC | Age: 38
End: 2023-10-25
Payer: COMMERCIAL

## 2023-10-25 DIAGNOSIS — G43.719 INTRACTABLE CHRONIC MIGRAINE WITHOUT AURA AND WITHOUT STATUS MIGRAINOSUS: Primary | ICD-10-CM

## 2023-10-25 PROCEDURE — 64615 PR CHEMODENERVATION OF MUSCLE FOR CHRONIC MIGRAINE: ICD-10-PCS | Mod: S$GLB,,, | Performed by: NURSE PRACTITIONER

## 2023-10-25 PROCEDURE — 99499 NO LOS: ICD-10-PCS | Mod: S$GLB,,, | Performed by: NURSE PRACTITIONER

## 2023-10-25 PROCEDURE — 99999 PR PBB SHADOW E&M-EST. PATIENT-LVL I: ICD-10-PCS | Mod: PBBFAC,,, | Performed by: NURSE PRACTITIONER

## 2023-10-25 PROCEDURE — 99499 UNLISTED E&M SERVICE: CPT | Mod: S$GLB,,, | Performed by: NURSE PRACTITIONER

## 2023-10-25 PROCEDURE — 64615 CHEMODENERV MUSC MIGRAINE: CPT | Mod: S$GLB,,, | Performed by: NURSE PRACTITIONER

## 2023-10-25 PROCEDURE — 99999 PR PBB SHADOW E&M-EST. PATIENT-LVL I: CPT | Mod: PBBFAC,,, | Performed by: NURSE PRACTITIONER

## 2023-10-25 NOTE — PROGRESS NOTES
Last botox 8/2/23  Procedures  BOTOX 155 Uadm/injection was performed     Frontalis- 15 units on each side   - 5 units on each side   Procerus- 10 units   Occipitalis- 15 units on each side   Temporalis - 20 units on each side   Trapezius- 15 units on each side   Cervical paraspinal muscles- 5 units on each side     Remains on qulipta 60mg qd  Doing well. No significant worsening this time close to repeat botox which is odd, they were more frequent last month attributes to brighter light/longer hot days and now this has improved/weather  Continue prn Imitrex  HST 6/2021: AHI 5(RDI 12)

## 2023-11-01 DIAGNOSIS — G43.719 INTRACTABLE CHRONIC MIGRAINE WITHOUT AURA AND WITHOUT STATUS MIGRAINOSUS: ICD-10-CM

## 2023-11-01 RX ORDER — ATOGEPANT 60 MG/1
1 TABLET ORAL DAILY
Qty: 30 TABLET | Refills: 11 | Status: SHIPPED | OUTPATIENT
Start: 2023-11-01 | End: 2024-01-03 | Stop reason: ALTCHOICE

## 2023-11-29 NOTE — PROGRESS NOTES
"Individual Psychotherapy (PhD/LCSW)    3/16/2017    Site:  [facility]         Therapeutic Intervention: Met with patient.  Outpatient - Insight oriented psychotherapy 45 min - CPT code 13937 and Outpatient - Supportive psychotherapy 45 min - CPT Code 63505    Chief complaint/reason for encounter: depression and anxiety     Interval history and content of current session: Patient returned to the clinic today for follow up appointment.  Feeling agitated today.  Reports some irritability of late.  Her central air has stopped working at her apartment, and her landlord is wanting she and her spouse to arrange for the repairs-including authorizing services.  This is frustrating to patient, as she doesn't feel they are responsible for this.  Had heated exchange with yolandad via e-mail.  Reports ongoing stress at work.  Feels like she is taking too much on.  Ambivalent about what her long term plans will be at work.  Reports she and her spouse may want to move away from [facility]-possibly to the [facility] or [place] area.  Recently started on Wellbutrin by Dr. Ratliff.  Encouraged patient to take inventory of her self-care.  Assignment given on the "miracle question."                    Treatment plan:  · Target symptoms: depression, anxiety   · Why chosen therapy is appropriate versus another modality: relevant to diagnosis, patient responds to this modality  · Outcome monitoring methods: self-report, observation  · Therapeutic intervention type: insight oriented psychotherapy, supportive psychotherapy    Risk parameters:  Patient reports no suicidal ideation  Patient reports no homicidal ideation  Patient reports no self-injurious behavior  Patient reports no violent behavior    Verbal deficits: None    Patient's response to intervention:  The patient's response to intervention is accepting.    Progress toward goals and other mental status changes:  The patient's progress toward goals is fair .    Diagnosis:     " Reno Ruiz is a 23 year old woman who presents today for routine gynecologic exam. Her menstrual cycle is regular, although she will occasionally miss a period.  She bleeds for 3-4 days.  Her bleeding is very light.  She has no intermenstrual bleeding. She does have dysmenorrhea. Reno is sexually active and is using birth control pills for birth control.  She has no dyspareunia.  She has no Postcoital bleeding.  She has no unusual vaginal discharge.  Patient currently does not have symptoms of incontinence.  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    OB/GYN HISTORY:   OB History    Para Term  AB Living   0 0 0 0 0 0   SAB IAB Ectopic Molar Multiple Live Births   0 0 0 0 0 0    Patient's last menstrual period was 2023 (approximate).  She had a LEEP procedure 2021 for KENNEDY 2.    PAST MEDICAL HISTORY:    Past Medical History:   Diagnosis Date   • Abnormal Pap smear of cervix     LSIL     PAST SURGICAL HISTORY:   Past Surgical History:   Procedure Laterality Date   • Colposcopy vag w/cervix  04/15/2022   • Colposcopy,loop electrd cervix excis  2022    CIN2   • Cyst removal  2017    Under anaesthesia     SOCIAL HISTORY:   Social History     Socioeconomic History   • Marital status: Single     Spouse name: Not on file   • Number of children: Not on file   • Years of education: Not on file   • Highest education level: Not on file   Occupational History   • Occupation: Diatician     Comment: [employer]   Tobacco Use   • Smoking status: Never   • Smokeless tobacco: Never   Vaping Use   • Vaping Use: never used   Substance and Sexual Activity   • Alcohol use: Yes     Comment: 1-2 weekly. Never more than 5 drinks at a time.   • Drug use: Never   • Sexual activity: Yes     Partners: Male     Birth control/protection: OCP   Other Topics Concern   • Not on file   Social History Narrative   • Not on file     Social Determinants of Health     Financial Resource Strain: Not on file    Food Insecurity: Not on file   Transportation Needs: Not on file   Physical Activity: Not on file   Stress: Not on file   Social Connections: Not on file   Intimate Partner Violence: Not on file     Review of patient's social economics indicates:   Diatician                 Comment: [employer]    FAMILY HISTORY:   Family History   Problem Relation Age of Onset   • Cancer, Skin Melanoma Father    • Hypertension Father    • Cancer, Breast Maternal Grandmother 50   • Diabetes Maternal Grandfather    • Heart disease Maternal Grandfather    • Hypertension Maternal Grandfather    • Dementia/Alzheimers Paternal Grandmother    • Dementia/Alzheimers Paternal Grandfather    • Cancer, Breast Maternal Aunt 40        BRCA +   • Cancer, Breast Paternal Aunt 40        BRCA +   • Cancer, Ovarian Neg Hx    • Cancer, Endometrial Neg Hx    • Cancer, Colon Neg Hx    • Myocardial Infarction Neg Hx    • Autoimmune Disease Neg Hx        REVIEW OF SYSTEMS:     CONSTITUTIONAL: Denies fever/sweats and unintentional weight change.  CARDIOVASCULAR:  Denies chest discomfort with exertion, SOB or palpitations.   RESPIRATORY: Denies cough, SOB, change in exercise tolerance.   GASTROINTESTINAL:  Denies abdominal pain, nausea, change in bowel habits, melena.   GENITOURINARY: Denies frequency, dysuria.  MUSCULOSKELETAL: Denies joint pain, muscle aches.   SKIN:  Denies concerns, changing moles.   NEUROLOGIC:  Denies changes.  PSYCHIATRIC: Denies changes.  HEMATOLOGIC/LYMPHATIC:  Denies abnormal bruising or bleeding, lumps or bumps.       OBJECTIVE:  Blood pressure 112/78, height 5' 8\" (1.727 m), weight 67.8 kg (149 lb 8 oz), last menstrual period 11/06/2023.  GENERAL: No acute distress andoriented x 3, mood appropriate  NECK: No thyromegaly.  LUNGS: Clear to auscultation.  HEART: Regular rate and rhythm.  BREASTS: Without masses or nipple discharge bilaterally. Breast self exam reviewed and encouraged.  ABDOMEN: Soft and nontender without  ICD-10-CM ICD-9-CM   1. MDD (major depressive disorder), recurrent episode, mild F33.0 296.31   2. Anxiety F41.9 300.00       Plan:  individual psychotherapy and medication management by physician    Return to clinic: 2 weeks    Length of Service (minutes): 45   masses or hepatomegaly.  EXTREMITIES: Nontender. No edema.  LYMPHATIC: No palpable neck or groin lymph nodes.  PELVIC EXAM: External genitalia: No lesions or masses noted and normal external genitalia.  VAGINA: Normal mucosa and no exudate present.  CERVIX: Normal, without  lesions or masses  and pap smear obtained.  UTERUS: Normal, mobile, non-tender.  ADNEXA: No masses , no tenderness and Non-palpable.  RECTOVAGINAL: Smooth, regular, confirmatory, sphincter intact and good tone.    IMPRESSION:  > Normal gynecologic exam  > history of LEEP for KENNEDY 2  > Good results with birth control pills    PLAN:  > Pap smear today  > BSE  > One year prescription of  Junel given  > Return to clinic 1 year or as needed

## 2024-01-03 ENCOUNTER — PATIENT MESSAGE (OUTPATIENT)
Dept: NEUROLOGY | Facility: CLINIC | Age: 39
End: 2024-01-03
Payer: COMMERCIAL

## 2024-01-03 DIAGNOSIS — G43.719 INTRACTABLE CHRONIC MIGRAINE WITHOUT AURA AND WITHOUT STATUS MIGRAINOSUS: Primary | ICD-10-CM

## 2024-01-03 RX ORDER — GALCANEZUMAB 120 MG/ML
240 INJECTION, SOLUTION SUBCUTANEOUS ONCE
Qty: 2 ML | Refills: 0 | Status: ACTIVE | OUTPATIENT
Start: 2024-01-03 | End: 2024-01-10

## 2024-01-03 RX ORDER — GALCANEZUMAB 120 MG/ML
120 INJECTION, SOLUTION SUBCUTANEOUS
Qty: 1 ML | Refills: 11 | Status: ACTIVE | OUTPATIENT
Start: 2024-01-03

## 2024-01-17 ENCOUNTER — TELEPHONE (OUTPATIENT)
Dept: NEUROLOGY | Facility: CLINIC | Age: 39
End: 2024-01-17
Payer: COMMERCIAL

## 2024-01-31 ENCOUNTER — PROCEDURE VISIT (OUTPATIENT)
Dept: NEUROLOGY | Facility: CLINIC | Age: 39
End: 2024-01-31
Payer: COMMERCIAL

## 2024-01-31 DIAGNOSIS — G43.719 INTRACTABLE CHRONIC MIGRAINE WITHOUT AURA AND WITHOUT STATUS MIGRAINOSUS: Primary | ICD-10-CM

## 2024-01-31 PROCEDURE — 64615 CHEMODENERV MUSC MIGRAINE: CPT | Mod: S$GLB,,, | Performed by: NURSE PRACTITIONER

## 2024-01-31 NOTE — PROGRESS NOTES
Last botox 10/25/23    Procedures  BOTOX 155 Uadm/injection was performed     Frontalis- 15 units on each side   - 5 units on each side   Procerus- 10 units   Occipitalis- 15 units on each side   Temporalis - 20 units on each side   Trapezius- 15 units on each side   Cervical paraspinal muscles- 5 units on each side     No more qulipta 60mg , began Emgalityload dose  Overdue. Had rough week with lapse of qulipta and receiving emgality.   Imitrex takes for bad headaches, no full migraines    HST 6/2021: AHI 5(RDI 12)

## 2024-02-06 ENCOUNTER — PATIENT MESSAGE (OUTPATIENT)
Dept: NEUROLOGY | Facility: CLINIC | Age: 39
End: 2024-02-06
Payer: COMMERCIAL

## 2024-02-06 DIAGNOSIS — G43.719 INTRACTABLE CHRONIC MIGRAINE WITHOUT AURA AND WITHOUT STATUS MIGRAINOSUS: ICD-10-CM

## 2024-02-06 RX ORDER — SUMATRIPTAN SUCCINATE 100 MG/1
TABLET ORAL
Qty: 18 TABLET | Refills: 11 | Status: SHIPPED | OUTPATIENT
Start: 2024-02-06

## 2024-04-24 ENCOUNTER — PROCEDURE VISIT (OUTPATIENT)
Dept: NEUROLOGY | Facility: CLINIC | Age: 39
End: 2024-04-24
Payer: COMMERCIAL

## 2024-04-24 DIAGNOSIS — G43.719 INTRACTABLE CHRONIC MIGRAINE WITHOUT AURA AND WITHOUT STATUS MIGRAINOSUS: Primary | ICD-10-CM

## 2024-04-24 PROCEDURE — 64615 CHEMODENERV MUSC MIGRAINE: CPT | Mod: S$GLB,,, | Performed by: NURSE PRACTITIONER

## 2024-04-24 RX ORDER — RIMEGEPANT SULFATE 75 MG/75MG
75 TABLET, ORALLY DISINTEGRATING ORAL ONCE AS NEEDED
Qty: 16 TABLET | Refills: 11 | Status: SHIPPED | OUTPATIENT
Start: 2024-04-24 | End: 2024-05-01

## 2024-04-24 RX ORDER — ATOGEPANT 60 MG/1
60 TABLET ORAL DAILY
Qty: 30 TABLET | Refills: 11 | Status: SHIPPED | OUTPATIENT
Start: 2024-04-24

## 2024-04-24 NOTE — PROCEDURES
Procedures    Procedures  BOTOX 155 Uadm/injection was performed     Frontalis- 15 units on each side   - 5 units on each side   Procerus- 10 units   Occipitalis- 15 units on each side   Temporalis - 20 units on each side   Trapezius- 15 units on each side   Cervical paraspinal muscles- 5 units on each side     No more qulipta 60mg which worked very well for her, because ins required her to try nurtec, aimovig and emgality. She has taken Emgality now 4mos and having more headaches, 2-3 x/week. Eye strain/weather factors.  , Imitrex takes for bad headaches     HST 6/2021: AHI 5(RDI 12)   total 26 HA since Jan.     Plan to RESUME qulipta 60mg qd

## 2024-07-17 ENCOUNTER — PROCEDURE VISIT (OUTPATIENT)
Dept: NEUROLOGY | Facility: CLINIC | Age: 39
End: 2024-07-17
Payer: COMMERCIAL

## 2024-07-17 DIAGNOSIS — G43.719 INTRACTABLE CHRONIC MIGRAINE WITHOUT AURA AND WITHOUT STATUS MIGRAINOSUS: Primary | ICD-10-CM

## 2024-07-17 PROCEDURE — 64615 CHEMODENERV MUSC MIGRAINE: CPT | Mod: S$GLB,,, | Performed by: NURSE PRACTITIONER

## 2024-07-17 NOTE — PROCEDURES
"   Procedures  BOTOX 155 Uadm/injection was performed     Frontalis- 15 units on each side   - 5 units on each side   Procerus- 10 units   Occipitalis- 15 units on each side   Temporalis - 20 units on each side   Trapezius- 15 units on each side   Cervical paraspinal muscles- 5 units on each side     Resumed qulipta 60mg and botox doing well overall,less frequentand less intense headaches  0 May, 2 June and 4 July, can realize when close to repeat botox time has more 2- 4wks prior. Finally no "11s" :) Eye strain/weather factors. Imitrex takes for bad headaches     HST 6/2021: AHI 5(RDI 12)  "

## 2024-07-30 ENCOUNTER — OFFICE VISIT (OUTPATIENT)
Dept: OPTOMETRY | Facility: CLINIC | Age: 39
End: 2024-07-30
Payer: COMMERCIAL

## 2024-07-30 DIAGNOSIS — H52.203 MYOPIA WITH ASTIGMATISM AND PRESBYOPIA, BILATERAL: ICD-10-CM

## 2024-07-30 DIAGNOSIS — G43.009 MIGRAINE WITHOUT AURA AND WITHOUT STATUS MIGRAINOSUS, NOT INTRACTABLE: Primary | ICD-10-CM

## 2024-07-30 DIAGNOSIS — H04.123 DRY EYE SYNDROME OF BOTH EYES: ICD-10-CM

## 2024-07-30 DIAGNOSIS — H52.4 MYOPIA WITH ASTIGMATISM AND PRESBYOPIA, BILATERAL: ICD-10-CM

## 2024-07-30 DIAGNOSIS — H52.13 MYOPIA WITH ASTIGMATISM AND PRESBYOPIA, BILATERAL: ICD-10-CM

## 2024-07-30 PROCEDURE — 92014 COMPRE OPH EXAM EST PT 1/>: CPT | Mod: S$GLB,,, | Performed by: OPTOMETRIST

## 2024-07-30 PROCEDURE — 1160F RVW MEDS BY RX/DR IN RCRD: CPT | Mod: CPTII,S$GLB,, | Performed by: OPTOMETRIST

## 2024-07-30 PROCEDURE — 99999 PR PBB SHADOW E&M-EST. PATIENT-LVL III: CPT | Mod: PBBFAC,,, | Performed by: OPTOMETRIST

## 2024-07-30 PROCEDURE — 1159F MED LIST DOCD IN RCRD: CPT | Mod: CPTII,S$GLB,, | Performed by: OPTOMETRIST

## 2024-07-30 PROCEDURE — 92015 DETERMINE REFRACTIVE STATE: CPT | Mod: S$GLB,,, | Performed by: OPTOMETRIST

## 2024-07-30 PROCEDURE — 4010F ACE/ARB THERAPY RXD/TAKEN: CPT | Mod: CPTII,S$GLB,, | Performed by: OPTOMETRIST

## 2024-07-30 RX ORDER — VALACYCLOVIR HYDROCHLORIDE 1 G/1
1000 TABLET, FILM COATED ORAL
COMMUNITY
Start: 2024-06-04

## 2024-07-30 RX ORDER — LISDEXAMFETAMINE DIMESYLATE 60 MG/1
60 CAPSULE ORAL
COMMUNITY
Start: 2024-07-03

## 2024-07-30 RX ORDER — LISDEXAMFETAMINE DIMESYLATE 50 MG/1
CAPSULE ORAL
COMMUNITY
Start: 2023-09-18

## 2024-07-30 NOTE — PROGRESS NOTES
ALMA DELIA    DANIEL: 06/23  Chief complaint (CC): Patient is here for annual eye exam today.  Patient   feels her vision at a distance and near are not as clear. Patient has   migraines and gets Botox to relieve. Patient recently had a Botox   treatment but is still having headaches and feels it may be due to eye   strain.  Glasses? + 2 yrs. old  Contacts? -  H/o eye surgery, injections or laser: -  H/o eye injury: -  Known eye conditions? See above  Family h/o eye conditions? MGF with AMD and Mother with blindness, unsure   of cause  Eye gtts? AT's prn      (-) Flashes (-)  Floaters (-) Mucous   (-)  Tearing (-) Itching (-) Burning   (-) Headaches (-) Eye Pain/discomfort (-) Irritation   (-)  Redness (-) Double vision (-) Blurry vision    Diabetic? -  A1c? -      Last edited by Carlita Mcbride on 7/30/2024  3:09 PM.            Assessment /Plan     For exam results, see Encounter Report.      Migraine without aura and without status migrainosus, not intractable  Stable. No e/o ocular pathology found in association.     Myopia with astigmatism and presbyopia, bilateral  SRx released to patient. Patient educated on lens options. Normal ocular health. RTC 1 year for routine exam.     Dry eye syndrome of both eyes  Recommend Systane Ultra or Refresh Optive TID-QID OU to aid with symptoms of dry eyes.

## 2024-08-19 ENCOUNTER — PATIENT MESSAGE (OUTPATIENT)
Dept: NEUROLOGY | Facility: CLINIC | Age: 39
End: 2024-08-19
Payer: COMMERCIAL

## 2024-10-14 NOTE — PROGRESS NOTES
OBSTETRICS AND GYNECOLOGY    Chief Complaint:  Well Woman Exam     HPI:      Radha Fonseca is a 39 y.o.  who presents today for well woman exam.    LMP: No LMP recorded (lmp unknown). Specifically, patient denies abnormal vaginal bleeding, abnormal discharge/odor, pelvic pain, or dysuria/hematuria. Ms. Fonseca is currently sexually active with a single male partner. She is currently using partner's vasectomy for contraception. She declines STD screening today.   Additionally she reports secondary amenorrhea. Has been having irregular cycles for almost 2 years. After IUD was removed, she had 6 months of regular, once monthly cycles. However since then, will only have occasional spotting, skipping entire months as well. No new medications. She denies additional issues, problems, or complaints.     Gardasil: Completed   Ms. Fonseca confirms that she wears her seatbelt when riding in the car.     OB History          0    Para   0    Term   0       0    AB   0    Living   0         SAB   0    IAB   0    Ectopic   0    Multiple   0    Live Births   0             GYNHx:  Menarche 10  Regular menses, once monthly previously; for past year, only irregular spotting; unsure when mother/sister went through change  History of STDs: no  History of abnormal pap smears: no  Sexually active: yes  # of lifetime partners: 2  Contraception in the past: IUD Mirena, nuvaring, OCPs  Breast/GYN/colon malignancy family history:  no    ROS:     GENERAL: Feeling well overall.   BREASTS: Denies breast skin changes, lumps or nipple discharge.   URINARY: Denies dysuria, hematuria.    Physical Exam:      PHYSICAL EXAM:  /64   Wt 110.2 kg (242 lb 13.4 oz)   LMP  (LMP Unknown)   BMI 38.03 kg/m²   Body mass index is 38.03 kg/m².     APPEARANCE:  Well nourished, well developed, in no acute distress.  Able to smile appropriately during our encounter. Makes eye contact. Pleasant.  PSYCH: Appropriate mood and  affect.  SKIN:   No acne or hirsutism.  CARDIOVASCULAR:  No edema of peripheral extremities. Well perfused throughout.  RESP:  No accessory muscle use to breathe. Speaking comfortably in complete sentences.   BREASTS:  Symmetrical, with no visible skin lesions or scars, no palpable masses. No nipple discharge or peau d'orange bilaterally. No palpable axillary LAD.  ABDOMEN:  Soft. Nonacute.    PELVIC:  Normal external genitalia without lesions. Normal hair distribution. Adequate perineal body, normal urethral meatus. Vagina moist and well rugated. Without lesions, without discharge. Cervix 3x4cm, nulliparous. Cervix pink, without ectocervical lesions, discharge or tenderness.  No ectropion. No significant cystocele or rectocele.  Bimanual exam shows uterus to be normal size, regular, mobile and nontender.  Adnexa without masses or tenderness.      Female chaperone present.    Assessment/Plan:     Well Woman Exam  -- Counseled patient regarding healthy diet and regular exercise, daily seat belt use.   -- BP normotensive  -- She denies abuse and feels safe at home.  -- Pap smear:  obtained   -- Contraception:  vasectomy of SO  -- MMG: ordered  -- STD screening:  declines   -- Pelvic US + UPT + Estradiol, LH, FSH, PRL, TSH for secondary amenorrhea  F/U 1-5wks    Counseling:     Patient was counseled today on current ASCCP pap guidelines, the recommendation for yearly physical exams, safe driving habits, and breast self awareness. She is to see her PCP for other health maintenance.     Use of the [facility] [employer] Patient Portal discussed and encouraged during today's visit.                 As of April 1, 2021, the Cures Act has been passed nationally. This new law requires that all doctors progress notes, lab results, pathology reports and radiology reports be released IMMEDIATELY to the patient in the patient portal. That means that the results are released to you at the EXACT same time they are released to me. Therefore, with  all of the patients that I have I am not able to reply to each patient exactly when the results come in. So there will be a delay from when you see the results to when I see them and have time to come up with a response to send you. Also I only see these results when I am on the computer at work. So if the results come in over the weekend or after 5 pm of a work day, I will not see them until the next business day. As you can tell, this is a challenge as a physician to give every patient the quick response they hope for and deserve. So please be patient!   Thanks for your understanding and patience.

## 2024-10-15 ENCOUNTER — OFFICE VISIT (OUTPATIENT)
Dept: OBSTETRICS AND GYNECOLOGY | Facility: CLINIC | Age: 39
End: 2024-10-15
Payer: COMMERCIAL

## 2024-10-15 ENCOUNTER — LAB VISIT (OUTPATIENT)
Dept: LAB | Facility: OTHER | Age: 39
End: 2024-10-15
Attending: STUDENT IN AN ORGANIZED HEALTH CARE EDUCATION/TRAINING PROGRAM
Payer: COMMERCIAL

## 2024-10-15 VITALS
BODY MASS INDEX: 38.03 KG/M2 | DIASTOLIC BLOOD PRESSURE: 64 MMHG | WEIGHT: 242.81 LBS | SYSTOLIC BLOOD PRESSURE: 124 MMHG

## 2024-10-15 DIAGNOSIS — Z11.51 ENCOUNTER FOR SCREENING FOR HUMAN PAPILLOMAVIRUS (HPV): ICD-10-CM

## 2024-10-15 DIAGNOSIS — Z12.4 ENCOUNTER FOR PAPANICOLAOU SMEAR FOR CERVICAL CANCER SCREENING: ICD-10-CM

## 2024-10-15 DIAGNOSIS — N93.9 ABNORMAL UTERINE BLEEDING (AUB): ICD-10-CM

## 2024-10-15 DIAGNOSIS — Z12.31 BREAST CANCER SCREENING BY MAMMOGRAM: ICD-10-CM

## 2024-10-15 DIAGNOSIS — Z01.419 ENCOUNTER FOR WELL WOMAN EXAM: Primary | ICD-10-CM

## 2024-10-15 LAB
B-HCG UR QL: NEGATIVE
CTP QC/QA: YES
ESTRADIOL SERPL-MCNC: 73 PG/ML
FSH SERPL-ACNC: 2.15 MIU/ML
LH SERPL-ACNC: 1 MIU/ML
PROLACTIN SERPL IA-MCNC: 15.6 NG/ML (ref 5.2–26.5)
TSH SERPL DL<=0.005 MIU/L-ACNC: 0.98 UIU/ML (ref 0.4–4)

## 2024-10-15 PROCEDURE — 36415 COLL VENOUS BLD VENIPUNCTURE: CPT | Performed by: STUDENT IN AN ORGANIZED HEALTH CARE EDUCATION/TRAINING PROGRAM

## 2024-10-15 PROCEDURE — 81025 URINE PREGNANCY TEST: CPT | Mod: S$GLB,,, | Performed by: STUDENT IN AN ORGANIZED HEALTH CARE EDUCATION/TRAINING PROGRAM

## 2024-10-15 PROCEDURE — 87624 HPV HI-RISK TYP POOLED RSLT: CPT | Performed by: STUDENT IN AN ORGANIZED HEALTH CARE EDUCATION/TRAINING PROGRAM

## 2024-10-15 PROCEDURE — 4010F ACE/ARB THERAPY RXD/TAKEN: CPT | Mod: CPTII,S$GLB,, | Performed by: STUDENT IN AN ORGANIZED HEALTH CARE EDUCATION/TRAINING PROGRAM

## 2024-10-15 PROCEDURE — 3074F SYST BP LT 130 MM HG: CPT | Mod: CPTII,S$GLB,, | Performed by: STUDENT IN AN ORGANIZED HEALTH CARE EDUCATION/TRAINING PROGRAM

## 2024-10-15 PROCEDURE — 88175 CYTOPATH C/V AUTO FLUID REDO: CPT | Performed by: STUDENT IN AN ORGANIZED HEALTH CARE EDUCATION/TRAINING PROGRAM

## 2024-10-15 PROCEDURE — 3008F BODY MASS INDEX DOCD: CPT | Mod: CPTII,S$GLB,, | Performed by: STUDENT IN AN ORGANIZED HEALTH CARE EDUCATION/TRAINING PROGRAM

## 2024-10-15 PROCEDURE — 84146 ASSAY OF PROLACTIN: CPT | Performed by: STUDENT IN AN ORGANIZED HEALTH CARE EDUCATION/TRAINING PROGRAM

## 2024-10-15 PROCEDURE — 99999 PR PBB SHADOW E&M-EST. PATIENT-LVL III: CPT | Mod: PBBFAC,,, | Performed by: STUDENT IN AN ORGANIZED HEALTH CARE EDUCATION/TRAINING PROGRAM

## 2024-10-15 PROCEDURE — 3078F DIAST BP <80 MM HG: CPT | Mod: CPTII,S$GLB,, | Performed by: STUDENT IN AN ORGANIZED HEALTH CARE EDUCATION/TRAINING PROGRAM

## 2024-10-15 PROCEDURE — 82670 ASSAY OF TOTAL ESTRADIOL: CPT | Performed by: STUDENT IN AN ORGANIZED HEALTH CARE EDUCATION/TRAINING PROGRAM

## 2024-10-15 PROCEDURE — 84443 ASSAY THYROID STIM HORMONE: CPT | Performed by: STUDENT IN AN ORGANIZED HEALTH CARE EDUCATION/TRAINING PROGRAM

## 2024-10-15 PROCEDURE — 83001 ASSAY OF GONADOTROPIN (FSH): CPT | Performed by: STUDENT IN AN ORGANIZED HEALTH CARE EDUCATION/TRAINING PROGRAM

## 2024-10-15 PROCEDURE — 99386 PREV VISIT NEW AGE 40-64: CPT | Mod: S$GLB,,, | Performed by: STUDENT IN AN ORGANIZED HEALTH CARE EDUCATION/TRAINING PROGRAM

## 2024-10-15 PROCEDURE — 83002 ASSAY OF GONADOTROPIN (LH): CPT | Performed by: STUDENT IN AN ORGANIZED HEALTH CARE EDUCATION/TRAINING PROGRAM

## 2024-10-15 PROCEDURE — 1159F MED LIST DOCD IN RCRD: CPT | Mod: CPTII,S$GLB,, | Performed by: STUDENT IN AN ORGANIZED HEALTH CARE EDUCATION/TRAINING PROGRAM

## 2024-10-15 RX ORDER — BREXPIPRAZOLE 1 MG/1
TABLET ORAL
COMMUNITY
Start: 2024-10-13

## 2024-10-16 LAB
CLINICAL INFO: NORMAL
DATE OF PREVIOUS PAP: NORMAL
DATE PREVIOUS BX: NO
LMP START DATE: NORMAL
SPECIMEN SOURCE CVX/VAG CYTO: NORMAL

## 2024-10-23 ENCOUNTER — PROCEDURE VISIT (OUTPATIENT)
Dept: NEUROLOGY | Facility: CLINIC | Age: 39
End: 2024-10-23
Payer: COMMERCIAL

## 2024-10-23 DIAGNOSIS — G43.719 INTRACTABLE CHRONIC MIGRAINE WITHOUT AURA AND WITHOUT STATUS MIGRAINOSUS: Primary | ICD-10-CM

## 2024-10-23 NOTE — PROGRESS NOTES
Procedures  BOTOX 155 Uadm/injection was performed     Frontalis- 15 units on each side   - 5 units on each side   Procerus- 10 units   Occipitalis- 15 units on each side   Temporalis - 20 units on each side   Trapezius- 15 units on each side   Cervical paraspinal muscles- 5 units on each side    takes qulipta 60mg and with botox doing well overall,less frequentand less intense headaches  Eye strain/weather factors. Imitrex takes for bad headaches     HST 6/2021: AHI 5(RDI 12)

## 2024-10-23 NOTE — PROCEDURES
Assessment:  Intractable chronic migraine, improved on Botox, having less frequent and less intense headaches    PLAN:  Continue qulipta 60mg qd  Continue prn PO imitrex   Continue Botox 155u IM q 12 wks (Frontalis- 15 units on each side   - 5 units on each side   Procerus- 10 units   Occipitalis- 15 units on each side   Temporalis - 20 units on each side   Trapezius- 10 units on each side   Cervical paraspinal muscles- 10 units on left side , 5 u R side)    10 HA/60d but doesn't always track mild ones, drinking lot of water can help abort/identifies triggers. New mattress could have been a factor with inc'd uptake of headaches Sept

## 2024-10-28 ENCOUNTER — HOSPITAL ENCOUNTER (OUTPATIENT)
Dept: RADIOLOGY | Facility: OTHER | Age: 39
Discharge: HOME | End: 2024-10-28
Attending: STUDENT IN AN ORGANIZED HEALTH CARE EDUCATION/TRAINING PROGRAM
Payer: COMMERCIAL

## 2024-10-28 ENCOUNTER — OFFICE VISIT (OUTPATIENT)
Dept: OBSTETRICS AND GYNECOLOGY | Facility: CLINIC | Age: 39
End: 2024-10-28
Payer: COMMERCIAL

## 2024-10-28 DIAGNOSIS — N91.5 OLIGOMENORRHEA, UNSPECIFIED TYPE: Primary | ICD-10-CM

## 2024-10-28 DIAGNOSIS — Z12.31 BREAST CANCER SCREENING BY MAMMOGRAM: ICD-10-CM

## 2024-10-28 PROCEDURE — 77067 SCR MAMMO BI INCL CAD: CPT | Mod: 26,,, | Performed by: RADIOLOGY

## 2024-10-28 PROCEDURE — 77067 SCR MAMMO BI INCL CAD: CPT | Mod: TC

## 2024-10-28 PROCEDURE — 4010F ACE/ARB THERAPY RXD/TAKEN: CPT | Mod: CPTII,S$GLB,, | Performed by: STUDENT IN AN ORGANIZED HEALTH CARE EDUCATION/TRAINING PROGRAM

## 2024-10-28 PROCEDURE — 77063 BREAST TOMOSYNTHESIS BI: CPT | Mod: TC

## 2024-10-28 PROCEDURE — 77063 BREAST TOMOSYNTHESIS BI: CPT | Mod: 26,,, | Performed by: RADIOLOGY

## 2024-10-28 PROCEDURE — 99214 OFFICE O/P EST MOD 30 MIN: CPT | Mod: S$GLB,,, | Performed by: STUDENT IN AN ORGANIZED HEALTH CARE EDUCATION/TRAINING PROGRAM

## 2025-01-06 ENCOUNTER — PATIENT MESSAGE (OUTPATIENT)
Dept: OBSTETRICS AND GYNECOLOGY | Facility: CLINIC | Age: 40
End: 2025-01-06
Payer: COMMERCIAL

## 2025-01-15 ENCOUNTER — PROCEDURE VISIT (OUTPATIENT)
Dept: NEUROLOGY | Facility: CLINIC | Age: 40
End: 2025-01-15
Payer: COMMERCIAL

## 2025-01-15 DIAGNOSIS — G43.719 INTRACTABLE CHRONIC MIGRAINE WITHOUT AURA AND WITHOUT STATUS MIGRAINOSUS: Primary | ICD-10-CM

## 2025-01-15 PROCEDURE — 99499 UNLISTED E&M SERVICE: CPT | Mod: S$GLB,,, | Performed by: NURSE PRACTITIONER

## 2025-01-15 PROCEDURE — 64615 CHEMODENERV MUSC MIGRAINE: CPT | Mod: S$GLB,,, | Performed by: NURSE PRACTITIONER

## 2025-01-15 NOTE — PROCEDURES
PROCEDURE NOTE:     After informed consent signed and dated, and risks and benefits of procedure was discussed, BOTOX injection was performed    Frontalis- 15 units on each side   - 5 units on each side   Procerus- 10 units   Occipitalis- 15 units on each side   Temporalis - 20 units on each side   Trapezius- 10 units on each side   Cervical paraspinal muscles- 10 units on left side , 5 u R side      Patient tolerated procedure well w/o any complications.     Less  this time, less eye strain/glare

## 2025-04-09 ENCOUNTER — PROCEDURE VISIT (OUTPATIENT)
Dept: NEUROLOGY | Facility: CLINIC | Age: 40
End: 2025-04-09
Payer: COMMERCIAL

## 2025-04-09 DIAGNOSIS — G43.719 INTRACTABLE CHRONIC MIGRAINE WITHOUT AURA AND WITHOUT STATUS MIGRAINOSUS: Primary | ICD-10-CM

## 2025-04-09 RX ORDER — RIMEGEPANT SULFATE 75 MG/75MG
75 TABLET, ORALLY DISINTEGRATING ORAL ONCE AS NEEDED
Qty: 16 TABLET | Refills: 5 | Status: SHIPPED | OUTPATIENT
Start: 2025-04-09 | End: 2025-04-12

## 2025-04-09 NOTE — PROCEDURES
Procedures  BOTOX 155 Uadm/injection was performed     Frontalis- 15 units on each side   - 5 units on each side   Procerus- 10 units   Occipitalis- 15 units on each side   Temporalis - 20 units on each side   Trapezius- 10 units on each side   Cervical paraspinal muscles- 10 units on left side , 5 u R side       Remains on qulipta 60mg and with botox doing well overall,less frequent and less intense headaches. Missed few days and bit more uptake recent week.   Eye strain/weather known factors. Imitrex takes for bad headaches, nurtec also helped but doesn't have any. Also take prn otc analgesics     HST 6/2021: AHI 5(RDI 12)

## 2025-04-11 ENCOUNTER — PATIENT MESSAGE (OUTPATIENT)
Dept: SLEEP MEDICINE | Facility: CLINIC | Age: 40
End: 2025-04-11
Payer: COMMERCIAL

## 2025-07-02 ENCOUNTER — PROCEDURE VISIT (OUTPATIENT)
Dept: NEUROLOGY | Facility: CLINIC | Age: 40
End: 2025-07-02
Payer: COMMERCIAL

## 2025-07-02 ENCOUNTER — PATIENT MESSAGE (OUTPATIENT)
Dept: NEUROLOGY | Facility: CLINIC | Age: 40
End: 2025-07-02

## 2025-07-02 DIAGNOSIS — G43.719 INTRACTABLE CHRONIC MIGRAINE WITHOUT AURA AND WITHOUT STATUS MIGRAINOSUS: Primary | ICD-10-CM

## 2025-07-02 NOTE — PROCEDURES
Procedures  BOTOX 155 Uadm/injection was performed     Frontalis- 15 units on each side   - 5 units on each side   Procerus- 10 units   Occipitalis- 15 units on each side   Temporalis - 20 units on each side   Trapezius- 10 units on each side   Cervical paraspinal muscles- 10 units on left side , 5 u R side       Remains on qulipta 60mg and with botox doing well overall,less frequent and less intense headaches except inc'd uptake 2 wks before due again. Eye strain/weather known factors. Imitrex takes for bad headaches  HST 6/2021: AHI 5(RDI 12)

## 2025-07-09 DIAGNOSIS — G43.719 INTRACTABLE CHRONIC MIGRAINE WITHOUT AURA AND WITHOUT STATUS MIGRAINOSUS: ICD-10-CM

## 2025-07-09 DIAGNOSIS — G43.719 INTRACTABLE CHRONIC MIGRAINE WITHOUT AURA AND WITHOUT STATUS MIGRAINOSUS: Primary | ICD-10-CM

## 2025-07-09 RX ORDER — RIMEGEPANT SULFATE 75 MG/75MG
75 TABLET, ORALLY DISINTEGRATING ORAL ONCE AS NEEDED
Qty: 16 TABLET | Refills: 5 | Status: SHIPPED | OUTPATIENT
Start: 2025-07-09 | End: 2025-07-10

## 2025-07-09 RX ORDER — ATOGEPANT 60 MG/1
60 TABLET ORAL DAILY
Qty: 30 TABLET | Refills: 11 | Status: SHIPPED | OUTPATIENT
Start: 2025-07-09

## 2025-07-23 ENCOUNTER — PATIENT MESSAGE (OUTPATIENT)
Dept: SLEEP MEDICINE | Facility: CLINIC | Age: 40
End: 2025-07-23
Payer: COMMERCIAL